# Patient Record
Sex: FEMALE | Race: WHITE | Employment: OTHER
[De-identification: names, ages, dates, MRNs, and addresses within clinical notes are randomized per-mention and may not be internally consistent; named-entity substitution may affect disease eponyms.]

---

## 2021-04-09 ENCOUNTER — TRANSCRIBE ORDERS (OUTPATIENT)
Dept: OTHER | Age: 66
End: 2021-04-09

## 2021-04-09 DIAGNOSIS — N83.209 OVARIAN CYST: Primary | ICD-10-CM

## 2021-04-09 NOTE — TELEPHONE ENCOUNTER
Action    Action Taken 4/9/21:  -Brian Film Room closed, faxed request  3:30 PM    4/12/21:    Imaging resolved to PACS  7:20 AM       RECORDS STATUS - ALL OTHER DIAGNOSIS      RECORDS RECEIVED FROM: Brian   DATE RECEIVED:    NOTES STATUS DETAILS   OFFICE NOTE from referring provider DOMINIQUE Del Valle: 4/9/21   OFFICE NOTE from medical oncologist     DISCHARGE SUMMARY from hospital     DISCHARGE REPORT from the ER     OPERATIVE REPORT DOMINIQUE Torres 6/16/20: EGD  6/27/12: Hysteoscopy   MEDICATION LIST     CLINICAL TRIAL TREATMENTS TO DATE     LABS     PATHOLOGY REPORTS     ANYTHING RELATED TO DIAGNOSIS     GENONOMIC TESTING     TYPE:     IMAGING (NEED IMAGES & REPORT)     CT SCANS     MRI     MAMMO     ULTRASOUND PACS 4/8/21: Brian   PET

## 2021-04-11 NOTE — PROGRESS NOTES
"Yamini is a 65 year old who is being evaluated via a billable video visit.      How would you like to obtain your AVS? MyChart     If the video visit is dropped, the invitation should be resent by: Text to cell phone: [phone]     Will anyone else be joining your video visit? No          Vitals - Patient Reported  Weight (Patient Reported): 53.5 kg (118 lb)  Height (Patient Reported): 162.6 cm (5' 4\")  BMI (Based on Pt Reported Ht/Wt): 20.25  Pain Score: No Pain (0)    Ryan Felix Biswas LPN    Video Start Time: 1048  Video-Visit Details    Type of service:  Video Visit    Video End Time:1138    Originating Location (pt. Location): Home    Distant Location (provider location):  [facility]     Platform used for Video Visit: [employer]                              Consult Notes on Referred Patient    Date: 2021       Dr. Sanchez referring provider defined for this encounter.       RE: Yamini Jackson  : 1955  DAVID: 2021    Dear Dr. Del Valle,    I had the pleasure of seeing your patient Yamini Jackson here at the Gynecologic Cancer Clinic at the [facility] on 2021.  As you know she is a very pleasant 65 year old woman with a recent diagnosis of complex left ovarian cyst.  Given these findings she was subsequently sent to the Gynecologic Cancer Clinic for new patient consultation.     2020 CT Ab/Pelv:  Pelvis: A 4 cm simple appearing cyst is in the left adnexa on series 2, image 117. Remainder of the pelvis is unremarkable.     2021 Pelvic US ([employer])  COMPARISON:  Pelvic ultrasound dated 2017 and CT of the pelvis dated 2020  FINDINGS:    Sonographic images demonstrate a normal size and smooth outer contour of the uterus. Uterus measures 5.4 cm in length by 3.1 cm in AP diameter by 4 point cm in transverse dimension.  The myometrium has a normal uniform echotexture. The endometrial lining appears normal and measures 2.0 mm in " composite thickness.         The right ovary appears normal and measures 2.4 x 1.3 x 2.1 cm. There is cystic enlargement of the left ovary which measures 7.0 x 2.7 x 4.7 cm. As compared to the prior sonogram dated 2017 and the prior CT dated 2000 the left ovarian cyst has increased in size. Currently it measures 4.7 x 2.8 x 4.3 cm and within one of the components there is layered debris. Previously there was a single thin internal septation and now there are several internal septations. There are no suspicious fluid collections within the cul-de-sac.         IMPRESSION:    Progressive enlargement and increasing complex appearance of the left ovarian cyst. Findings would raise a concern for malignancy and GYN referral recommended.      2021:  17      Today: Feeling good. Early-, had CT scan for RUQ pain and left ovarian cyst noted incidentally. Opted to track it after that. Then another CT last summer due to hospitalized diverticulitis and again left cyst noted incidentally. Have had GI issues chronically. Currently working on diet, have lost weight. Still working on gaining weight but haven't recovered completely. RUQ pain has come and gone, possibly triggered by fatty foods.  No other non-specific abdominal pain, everything previous has been linked to GI.  No bleeding.  No current pain with urination (has had a cystoscopy in the past - 2 years ago). No fevers, chills. No SOB or chestpain. Bowels and bladder working. Gastritis last April related to food and stress of COVID. Ended up in hospital with chills and fever and right sided pain near GB. Diagnosed with diverticulitis and responded to antibiotics. Strong family hx of colon cancer, sister with Crohn's disease.         Review of Systems:    I have reviewed the pertinent positive findings in the review of symptoms with the patient.        Past Medical History:    No heart disease or lung dz  HTN   x 3    Past Surgical History:      "Cervix polyp removed.  No abdominal surgery    Health Maintenance:  Health Maintenance Due   Topic Date Due     PREVENTIVE CARE VISIT  Never done     ADVANCE CARE PLANNING  Never done     MAMMO SCREENING  Never done     COLORECTAL CANCER SCREENING  Never done     HIV SCREENING  Never done     COVID-19 Vaccine (1) Never done     HEPATITIS C SCREENING  Never done     PAP  Never done     DTAP/TDAP/TD IMMUNIZATION (1 - Tdap) Never done     LIPID  Never done     ZOSTER IMMUNIZATION (1 of 2) Never done     INFLUENZA VACCINE (1) 2020     PHQ-2  Never done       Last Pap Smear:               Result: normal       Last Mammogram: 2019            Result: abnormal: followed up with US      She has had a history of abnormal mammograms.    Last Colonoscopy: 2020              Result: abnormal: diverticulosis done every 5 years.              Current Medications:     has a current medication list which includes the following prescription(s): metoprolol succinate er and omeprazole.       Allergies:     Allergies   Allergen Reactions     Gluten Meal Other (See Comments)     \"doest't eat, gets achy from it\"     Ibuprofen Hives and Itching     PN: LW Reaction: HIVES              Social History:     Social History     Tobacco Use     Smoking status: Not on file   Substance Use Topics     Alcohol use: Not on file       History   Drug Use Not on file           Family History:     The patient's family history is notable for the following    No uterine cancer in family.  No breast, ovarian  Colon cancer-mother age 63 right sided colon ca, aunt early 70's (maternal aunt), another maternal aunt with \"cancer of the omentum\"  age 61.    Physical Exam:     There were no vitals taken for this visit.  There is no height or weight on file to calculate BMI.    General Appearance: healthy and alert, no distress     No physical exam or VS done due to virtual visit because of COVID 19.        Assessment:    Yamini DERIK Jackson is a 65 " year old woman with a new diagnosis of complex ovarian mass            Plan:     1.)    Plan robotic TLH/BSO possible open possible cancer staging based on complex ovarian mass that is increasing in size.     2.) Genetic risk factors were assessed and the patient does meet qualifications for referral: colon cancer in multiple family members on maternal side-concern for Simons syndrome.     3.) Labs and/or tests ordered include:  Pre op exam, consult with GC     4.) Health maintenance issues addressed today include UTD    5.) Pre-op teaching was completed today.  Risks of surgery were discussed to include: bleeding, transfusion, infection, unintentional injury to surrounding organs/structures.      Thank you for allowing us to participate in the care of your patient.         Sincerely,  Saranya Higgins MD    Department of Ob/Gyn and Women's Health  Division of Gynecologic Oncology  [facility]  [phone]        No care team member to display

## 2021-04-12 ENCOUNTER — PRE VISIT (OUTPATIENT)
Dept: ONCOLOGY | Facility: CLINIC | Age: 66
End: 2021-04-12

## 2021-04-12 ENCOUNTER — VIRTUAL VISIT (OUTPATIENT)
Dept: ONCOLOGY | Facility: CLINIC | Age: 66
End: 2021-04-12
Attending: OBSTETRICS & GYNECOLOGY
Payer: COMMERCIAL

## 2021-04-12 DIAGNOSIS — R19.00 PELVIC MASS: Primary | ICD-10-CM

## 2021-04-12 DIAGNOSIS — Z80.0 FAMILY HISTORY OF COLON CANCER: ICD-10-CM

## 2021-04-12 PROCEDURE — 999N001193 HC VIDEO/TELEPHONE VISIT; NO CHARGE

## 2021-04-12 PROCEDURE — 99204 OFFICE O/P NEW MOD 45 MIN: CPT | Mod: GT | Performed by: OBSTETRICS & GYNECOLOGY

## 2021-04-12 RX ORDER — PHENAZOPYRIDINE HYDROCHLORIDE 200 MG/1
200 TABLET, FILM COATED ORAL ONCE
Status: CANCELLED | OUTPATIENT
Start: 2021-04-12 | End: 2021-04-12

## 2021-04-12 RX ORDER — ACETAMINOPHEN 325 MG/1
975 TABLET ORAL ONCE
Status: CANCELLED | OUTPATIENT
Start: 2021-04-12 | End: 2021-04-12

## 2021-04-12 RX ORDER — METOPROLOL SUCCINATE 25 MG/1
25 TABLET, EXTENDED RELEASE ORAL EVERY MORNING
COMMUNITY
Start: 2021-03-31

## 2021-04-12 RX ORDER — CEFAZOLIN SODIUM 2 G/50ML
2 SOLUTION INTRAVENOUS
Status: CANCELLED | OUTPATIENT
Start: 2021-04-12

## 2021-04-12 RX ORDER — HEPARIN SODIUM 5000 [USP'U]/.5ML
5000 INJECTION, SOLUTION INTRAVENOUS; SUBCUTANEOUS
Status: CANCELLED | OUTPATIENT
Start: 2021-04-12

## 2021-04-12 NOTE — LETTER
"    2021         RE: Yamini Jackson  [address]  [place] [address]        Dear Colleague,    Thank you for referring your patient, Yamini Jackson, to the [facility]. Please see a copy of my visit note below.    Yamini is a 65 year old who is being evaluated via a billable video visit.      How would you like to obtain your AVS? MyChart     If the video visit is dropped, the invitation should be resent by: Text to cell phone: [phone]     Will anyone else be joining your video visit? No          Vitals - Patient Reported  Weight (Patient Reported): 53.5 kg (118 lb)  Height (Patient Reported): 162.6 cm (5' 4\")  BMI (Based on Pt Reported Ht/Wt): 20.25  Pain Score: No Pain (0)    Ryan Ledezma LPN    Video Start Time: 1048  Video-Visit Details    Type of service:  Video Visit    Video End Time:1138    Originating Location (pt. Location): Home    Distant Location (provider location):  [facility]     Platform used for Video Visit: [employer]                              Consult Notes on Referred Patient    Date: 2021       Dr. Sanchez referring provider defined for this encounter.       RE: Yamini Jackson  : 1955  DAVID: 2021    Dear Dr. Del Valle,    I had the pleasure of seeing your patient Yamini Jackson here at the Gynecologic Cancer Clinic at the [facility] on 2021.  As you know she is a very pleasant 65 year old woman with a recent diagnosis of complex left ovarian cyst.  Given these findings she was subsequently sent to the Gynecologic Cancer Clinic for new patient consultation.     2020 CT Ab/Pelv:  Pelvis: A 4 cm simple appearing cyst is in the left adnexa on series 2, image 117. Remainder of the pelvis is unremarkable.     2021 Pelvic US ([facility])  COMPARISON:  Pelvic ultrasound dated 2017 and CT of the pelvis dated 2020  FINDINGS:    Sonographic images demonstrate " a normal size and smooth outer contour of the uterus. Uterus measures 5.4 cm in length by 3.1 cm in AP diameter by 4 point cm in transverse dimension.  The myometrium has a normal uniform echotexture. The endometrial lining appears normal and measures 2.0 mm in composite thickness.         The right ovary appears normal and measures 2.4 x 1.3 x 2.1 cm. There is cystic enlargement of the left ovary which measures 7.0 x 2.7 x 4.7 cm. As compared to the prior sonogram dated 07/13/2017 and the prior CT dated 06/14/2000 the left ovarian cyst has increased in size. Currently it measures 4.7 x 2.8 x 4.3 cm and within one of the components there is layered debris. Previously there was a single thin internal septation and now there are several internal septations. There are no suspicious fluid collections within the cul-de-sac.         IMPRESSION:    Progressive enlargement and increasing complex appearance of the left ovarian cyst. Findings would raise a concern for malignancy and GYN referral recommended.      4/9/2021:  17      Today: Feeling good. Early-2000s, had CT scan for RUQ pain and left ovarian cyst noted incidentally. Opted to track it after that. Then another CT last summer due to hospitalized diverticulitis and again left cyst noted incidentally. Have had GI issues chronically. Currently working on diet, have lost weight. Still working on gaining weight but haven't recovered completely. RUQ pain has come and gone, possibly triggered by fatty foods.  No other non-specific abdominal pain, everything previous has been linked to GI.  No bleeding.  No current pain with urination (has had a cystoscopy in the past - 2 years ago). No fevers, chills. No SOB or chestpain. Bowels and bladder working. Gastritis last April related to food and stress of COVID. Ended up in hospital with chills and fever and right sided pain near GB. Diagnosed with diverticulitis and responded to antibiotics. Strong family hx of colon  "cancer, sister with Crohn's disease.         Review of Systems:    I have reviewed the pertinent positive findings in the review of symptoms with the patient.        Past Medical History:    No heart disease or lung dz  HTN   x 3    Past Surgical History:     Cervix polyp removed.  No abdominal surgery    Health Maintenance:  Health Maintenance Due   Topic Date Due     PREVENTIVE CARE VISIT  Never done     ADVANCE CARE PLANNING  Never done     MAMMO SCREENING  Never done     COLORECTAL CANCER SCREENING  Never done     HIV SCREENING  Never done     COVID-19 Vaccine (1) Never done     HEPATITIS C SCREENING  Never done     PAP  Never done     DTAP/TDAP/TD IMMUNIZATION (1 - Tdap) Never done     LIPID  Never done     ZOSTER IMMUNIZATION (1 of 2) Never done     INFLUENZA VACCINE (1) 2020     PHQ-2  Never done       Last Pap Smear:               Result: normal       Last Mammogram: 2019            Result: abnormal: followed up with US      She has had a history of abnormal mammograms.    Last Colonoscopy: 2020              Result: abnormal: diverticulosis done every 5 years.              Current Medications:     has a current medication list which includes the following prescription(s): metoprolol succinate er and omeprazole.       Allergies:     Allergies   Allergen Reactions     Gluten Meal Other (See Comments)     \"doest't eat, gets achy from it\"     Ibuprofen Hives and Itching     PN: LW Reaction: HIVES              Social History:     Social History     Tobacco Use     Smoking status: Not on file   Substance Use Topics     Alcohol use: Not on file       History   Drug Use Not on file           Family History:     The patient's family history is notable for the following    No uterine cancer in family.  No breast, ovarian  Colon cancer-mother age 63 right sided colon ca, aunt early 70's (maternal aunt), another maternal aunt with \"cancer of the omentum\"  age 61.    Physical Exam:     There were " no vitals taken for this visit.  There is no height or weight on file to calculate BMI.    General Appearance: healthy and alert, no distress     No physical exam or VS done due to virtual visit because of COVID 19.        Assessment:    Yamini Jackson is a 65 year old woman with a new diagnosis of complex ovarian mass            Plan:     1.)    Plan robotic TLH/BSO possible open possible cancer staging based on complex ovarian mass that is increasing in size.     2.) Genetic risk factors were assessed and the patient does meet qualifications for referral: colon cancer in multiple family members on maternal side-concern for Simons syndrome.     3.) Labs and/or tests ordered include:  Pre op exam, consult with GC     4.) Health maintenance issues addressed today include UTD    5.) Pre-op teaching was completed today.  Risks of surgery were discussed to include: bleeding, transfusion, infection, unintentional injury to surrounding organs/structures.      Thank you for allowing us to participate in the care of your patient.         Sincerely,  Saranya Higgins MD    Department of Ob/Gyn and Women's Health  Division of Gynecologic Oncology  [facility]  [phone]        No care team member to display          Again, thank you for allowing me to participate in the care of your patient.        Sincerely,        Saranya Higgins MD

## 2021-04-13 NOTE — NURSING NOTE
Pre Op Nurse Teaching Template    Relevant Diagnosis: ovarian cyst    Teaching Topic: HYSTERECTOMY, TOTAL, ROBOT-ASSISTED, WITH BILATERAL SALPINGO-OOPHORECTOMY, AND BILATERAL PELVIC LYMPHADENECTOMY, possible open abdomen and cancer staging, cystoscopy    Person(s) involved in teaching :  Patient  Will send preop packet to pt and review via telephone  Motivation Level:  Asks Questions:    Yes      Eager to Learn:     Yes     Cooperative:          Yes    Receptive (willing. Able to accept information):    Yes      Patient and those who are listed above demonstrates understanding of the following:   Reason for the appointment, diagnosis and treatment plan:   Yes   Knowledge of proper use of medications and conditions for which they are ordered (with special attention to potential side effects or drug interactions): Yes   Which situations necessitate calling provider and whom to contact: Yes         Nutritional needs and diet plan:  Yes      Pain management techniques:     Yes, Pain Scale   Diet:   Yes, [facility] Diet Instructions    Teaching Concerns addressed: Yes    Infection Prevention:  Patient and those who are listed above demonstrate understanding of the following:  Pre-Op CHG Bathing Instructions: Yes  Surgical procedure site care taught:   Yes   Signs and symptoms of infection taught: Yes       Instructional Materials Used/Given:  The [place] Before You Surgery Booklet  Showering or Bathing before Surgery Instructions & CHG Product  Hysterectomy Guidelines  Pain Assessment Tool   Home Care after Major Abdominal or Vaginal Surgery  Map  Accommodations Brochure  Phone numbers for [facility] and [facility]  Copy of Surgical Consent    Done Today:    Preop Visit needed with PAC  Tests Ordered:  Post Op Visit Scheduled:tbd  Comments:    Surgery date/time:tbd  Genetic counseling appt requested  covid test to be done 3-4 days before surgery date    Consent to file in medical records  .

## 2021-04-13 NOTE — PATIENT INSTRUCTIONS
Surgery to be scheduled  Pre operative visit with PAC/PCP, scheduling will call you to help make appointments   Post operative visit will be set up about 2 weeks after your surgery, you will be contacted with at date and time  Referral to genetic counseling, scheduling will call to help make appointment

## 2021-04-14 ENCOUNTER — VIRTUAL VISIT (OUTPATIENT)
Dept: ONCOLOGY | Facility: CLINIC | Age: 66
End: 2021-04-14
Attending: OBSTETRICS & GYNECOLOGY
Payer: COMMERCIAL

## 2021-04-14 DIAGNOSIS — Z80.42 FAMILY HISTORY OF PROSTATE CANCER: ICD-10-CM

## 2021-04-14 DIAGNOSIS — Z80.0 FAMILY HISTORY OF COLON CANCER: Primary | ICD-10-CM

## 2021-04-14 DIAGNOSIS — R19.00 PELVIC MASS: ICD-10-CM

## 2021-04-14 PROCEDURE — 96040 HC GENETIC COUNSELING, EACH 30 MINUTES: CPT | Mod: GT | Performed by: GENETIC COUNSELOR, MS

## 2021-04-14 NOTE — LETTER
4/14/2021         RE: Yamini Jackson  [address] [place] [address]        Dear Colleague,    Thank you for referring your patient, Yamini Jackson, to the [facility]. Please see a copy of my visit note below.    4/14/2021    Referring Provider: Saranya Higgins MD    Presenting Information:   I met with Yamini Jackson today for genetic counseling at the Cancer Risk Management Program (virtual visit) to discuss her family history of colon cancer.  She is here today to review this history, cancer screening recommendations, and available genetic testing options.    Personal History:  Yamini is a 65 year old female.  She has seen Dr. Higgins to discuss surgical options for a complex ovarian mass (surgery tentatively planned for May).  Genetic testing has been requested to help determine whether a risk reducing hysterectomy would be considered at the time of surgery.  Her most recent mammogram was 11/25/2019, and colonoscopy was 8/17/2020 (follow up recommended in 5 years.       Family History: (Please see scanned pedigree for detailed family history information)    Her mother was diagnosed with colon cancer at age 63    One maternal aunt was diagnosed with colon cancer at age 72    One maternal aunt was diagnosed with a metastatic cancer at age 59 involving the omentum    Her maternal grandfather had a history of colon cancer and passed away at age 60    Her father was diagnosed with prostate cancer in his late 70's and passed away at age 86    Her maternal ethnicity is Cymro/Palestinian/Chinese. Her paternal ethnicity is [place].  There is no known Ashkenazi Mormonism ancestry on either side of her family.     Discussion:    Yamini's family history of colon cancer is suggestive of a hereditary cancer syndrome.    We reviewed the features of sporadic, familial, and hereditary cancers.  Based on her family history, Yamini meets current National Comprehensive Cancer Network  (NCCN) criteria for genetic testing of Simons syndrome.      We discussed the natural history and genetics of Ismons syndrome. There are currently five genes known to cause Simons Syndrome: MLH1, MSH2, MSH6, PMS2, and EPCAM.  A single mutation in one of the Simons Syndrome genes increases the risk for colon, endometrial, ovarian, and stomach cancers.  Other cancers that can be seen in some families with Simons Syndrome include pancreatic, bladder, biliary tract, urothelial, small bowel, prostate, breast and brain cancers.    A detailed handout regarding hereditary colon cancer and the information we discussed was provided to Yamini at the end of our appointment today and can be found in the after visit summary. Topics included: inheritance pattern, cancer risks, cancer screening recommendations, and also risks, benefits and limitations of testing.     We discussed that there are additional genes that could cause increased risk for colorectal, gynecologic, and other cancers. As many of these genes present with overlapping features in a family and accurate cancer risk cannot always be established based upon the pedigree analysis alone, it would be reasonable for Yamini to consider panel genetic testing to analyze multiple genes at once.    We discussed that the results from genetic testing may determine increased cancer screening, as well as options for risk reducing surgeries Yamini could consider.  These results may also help to guide surgery decision making for her ovarian mass.  In addition, the results of Yamini's test may also determine testing recommendations for her close relatives, including her children and siblings. These recommendations will be discussed in detail once genetic testing is completed.     Plan:  1) Today Yamini elected to proceed with the CancerNext gene panel through [employer].  2) This information should be available in 4 weeks.  Of note, results have been requested prior to  Yamini's surgery (tentatively scheduled for 3-5 weeks from now).    3) Yamini will return to clinic to discuss the results.    Face to face time: 60 minutes    Deandra Stein MS, [facility]  Licensed, Certified Genetic Counselor  [facility]  Phone: [phone]                Again, thank you for allowing me to participate in the care of your patient.        Sincerely,        Deandra Stein GC

## 2021-04-14 NOTE — PROGRESS NOTES
4/14/2021    Referring Provider: Saranya Higgins MD    Presenting Information:   I met with Yamini Jackson today for genetic counseling at the Cancer Risk Management Program (virtual visit) to discuss her family history of colon cancer.  She is here today to review this history, cancer screening recommendations, and available genetic testing options.    Personal History:  Yamini is a 65 year old female.  She has seen Dr. Higgins to discuss surgical options for a complex ovarian mass (surgery tentatively planned for May).  Genetic testing has been requested to help determine whether a risk reducing hysterectomy would be considered at the time of surgery.  Her most recent mammogram was 11/25/2019, and colonoscopy was 8/17/2020 (follow up recommended in 5 years.       Family History: (Please see scanned pedigree for detailed family history information)    Her mother was diagnosed with colon cancer at age 63    One maternal aunt was diagnosed with colon cancer at age 72    One maternal aunt was diagnosed with a metastatic cancer at age 59 involving the omentum    Her maternal grandfather had a history of colon cancer and passed away at age 60    Her father was diagnosed with prostate cancer in his late 70's and passed away at age 86    Her maternal ethnicity is Burkinan/Andorran/Faroese. Her paternal ethnicity is Yi.  There is no known Ashkenazi Buddhist ancestry on either side of her family.     Discussion:    Yamini's family history of colon cancer is suggestive of a hereditary cancer syndrome.    We reviewed the features of sporadic, familial, and hereditary cancers.  Based on her family history, Yamini meets current National Comprehensive Cancer Network (NCCN) criteria for genetic testing of Simons syndrome.      We discussed the natural history and genetics of Simons syndrome. There are currently five genes known to cause Simons Syndrome: MLH1, MSH2, MSH6, PMS2, and EPCAM.  A single mutation in one of the Simons  Syndrome genes increases the risk for colon, endometrial, ovarian, and stomach cancers.  Other cancers that can be seen in some families with Simons Syndrome include pancreatic, bladder, biliary tract, urothelial, small bowel, prostate, breast and brain cancers.    A detailed handout regarding hereditary colon cancer and the information we discussed was provided to Yamini at the end of our appointment today and can be found in the after visit summary. Topics included: inheritance pattern, cancer risks, cancer screening recommendations, and also risks, benefits and limitations of testing.     We discussed that there are additional genes that could cause increased risk for colorectal, gynecologic, and other cancers. As many of these genes present with overlapping features in a family and accurate cancer risk cannot always be established based upon the pedigree analysis alone, it would be reasonable for Yamini to consider panel genetic testing to analyze multiple genes at once.    We discussed that the results from genetic testing may determine increased cancer screening, as well as options for risk reducing surgeries Yamini could consider.  These results may also help to guide surgery decision making for her ovarian mass.  In addition, the results of Yamini's test may also determine testing recommendations for her close relatives, including her children and siblings. These recommendations will be discussed in detail once genetic testing is completed.     Plan:  1) Today Yamini elected to proceed with the CancerNext gene panel through [employer].  2) This information should be available in 4 weeks.  Of note, results have been requested prior to Yamini's surgery (tentatively scheduled for 3-5 weeks from now).    3) Yamini will return to clinic to discuss the results.    Face to face time: 60 minutes    Deandra Stein MS, [facility] [place]  Licensed, Certified Genetic Counselor  [facility]  Phone:  [phone]

## 2021-04-15 NOTE — PATIENT INSTRUCTIONS
Assessing Cancer Risk  Only about 5-10% of cancers are thought to be due to an inherited cancer susceptibility gene.    These families often have:  ? Several people with the same or related types of cancer  ? Cancers diagnosed at a young age (before age 50)  ? Individuals with more than one primary cancer  ? Multiple generations of the family affected with cancer    Comprehensive Colon Cancer Panel  We each inherit two copies of every gene in our bodies: one from our mother, and one from our father.  Each gene has a specific job to do.  When a gene has a mistake or  mutation  in it, it does not work like it should.      This handout will review common hereditary colon cancer syndromes, and other genes related to an increased risk for colon cancer.  The genes that will be discussed in this handout are: APC, BMPR1A, CDH1, CHEK2, EPCAM, GREM1, MLH1, MSH2, MSH6, MUTYH, PMS2, POLD1, POLE, PTEN, SMAD4, STK11, and TP53.  These genes are clinically actionable, meaning there are published guidelines for cancer screening and management for individuals who are found to carry mutations in these genes. Inheriting a mutation does not mean a person will develop cancer, but it does significantly increase his or her risk above the general population risk.      Familial Adenomatous Polyposis (FAP)  FAP is a hereditary cancer syndrome caused by mutations in the APC gene. The condition is known to cause hundreds to thousands of adenomatous polyps in the colon, creating a  carpet  of polyps. Some individuals have what is called attenuated FAP (AFAP), a milder form of FAP with fewer polyps and typically later onset. Individuals with an APC gene mutation are at an increased risk for colon, thyroid, and duodenal cancers, as well as several other types of cancer1.  Other features of this condition may include: osteomas, dental anomalies, benign skin lesions, CHRPE ( freckle  on the inside of the eye), and desmoid tumors.      Lifetime  Cancer Risks     Cancer Type General Population FAP   Colon  5% near 100%   Thyroid (papillary) 1% 1-12%   Duodenal <1% 5%   Liver  <1% 1-2% before age 5   Pancreas <1% 1%   Stomach <1% 1%       Juvenile Polyposis Syndrome (JPS)  JPS is characterized by hamartomatous polyps, called juvenile polyps, in the gastrointestinal tract.  Juvenile  refers to the type of polyps seen in this hereditary cancer condition, not the age of onset. Currently, mutations in two genes are known to cause JPS: BMPR1A and SMAD4. Of individuals clinically diagnosed with JPS, only 40% have an identifiable mutation in one of these genes, suggesting there are other genes that cause JPS that have not been discovered yet. Individuals with JPS are at an increased risk for colon cancer and stomach cancer 2,3,4. Pancreatic and small bowel cancers have also been reported in JPS, but the actual risks are unknown.         Lifetime Cancer Risks    Cancer Type General Population JPS   Colon 5% 40-50%   Gastric/Duodenal <1% 10-21%     Some individuals with SMAD4 mutations have a condition called JPS/HHT (Juvenile Polyposis/Hereditary Hemorrhagic Telangiectasia) where in addition to JPS, individuals may have nose bleeds and clotting issues.     Hereditary Diffuse Gastric Cancer (HDGC)  Currently, mutations in one gene are known to cause Hereditary Diffuse Gastric Cancer: CDH1.  Individuals with HDGC are at increased risk for diffuse gastric cancer and lobular breast cancer. Of people diagnosed with HDGC, 30-50% have a mutation in the CDH1 gene.  This suggests there are likely mutations in other genes that may cause HDGC that have not been identified yet. Individuals with HDGC may also be at increased risk for colon cancer.      Lifetime Cancer Risks    Cancer Type General Population HDGC   Diffuse Gastric <1% 67-83%   Breast 12% 39-52%     Simons syndrome  Mutations in five different genes are known to cause Simons syndrome: MLH1, MSH2, MSH6, PMS2, and  EPCAM. Individuals with Simons syndrome have an increased risk for colon, uterine, ovarian, small bowel, stomach, urinary tract, and brain cancer, as well as several other types of cancer. The exact lifetime cancer risks are dependent upon the gene in which the mutation was identified.      Lifetime Cancer Risks    Cancer Type General Population Simons syndrome   Colon 5% 12-52%   Uterine 2-3% Up to 57%   Stomach <1% Up to 16%   Ovarian 2% Up to 38%   Urinary tract <1% 1-7%   Hepatobiliary tract <1% 1-4%   Small bowel <1% Up to 11%   Brain/CNS <1% Not well established   Pancreas <1% Up to 6%       MUTYH-Associated Polyposis (MAP)  MAP is a hereditary cancer syndrome caused by mutations in the MUTYH gene. Unlike the other hereditary cancer genes discussed in this handout, two mutations in the MUTYH gene cause MAP and increase cancer risk. Those affected with MAP typically have between  adenomatous polyps. This syndrome also increases the risk for colon and duodenal cancer. Current research suggests that other cancers may be associated with MUTYH mutations, as well. The table below includes the risk that someone with two MUTYH gene mutations would develop cancer in their lifetime; of note, there is also an increased colon cancer risk for individuals who carry only one MUTYH gene mutation5,6,7.       Lifetime Cancer Risks    Cancer Type General Population MAP   Colon 5% %   Duodenal  <1% 5%       Cowden syndrome  Cowden syndrome is a hereditary condition that increases the risk for breast, thyroid, endometrial, colon, and kidney cancer.  A single mutation in the PTEN gene causes Cowden syndrome and increases cancer risk.  The table below shows the chance that someone with a PTEN mutation would develop cancer in their lifetime8,9.  Other benign features seen in some individuals with Cowden syndrome include benign skin lesions (facial papules, keratoses, lipomas), learning disabilities, autism, thyroid nodules,  hamartomatous colon polyps, and larger head size.      Lifetime Cancer Risks   Cancer Type General Population Cowden Syndrome   Breast 12% 25-50%*   Thyroid 1% 35%   Renal 1-2% 35%   Endometrial 2-3% 28%   Colon 5% 9%   Melanoma 2-3% >5%     *One recent study found breast cancer risk to be increased to 85%    Peutz-Jeghers syndrome (PJS)  PJS is a hereditary cancer syndrome caused by mutations in the STK11 gene. This condition can be distinguished from other hereditary syndromes by the presence of hamartomatous polyps in the gastrointestinal tract and freckles present in unusual places such as the hands, feet, neck, and lips. Individuals with Peutz-Jeghers syndrome have an increased risk for colon, breast, pancreatic, and other cancers3.  Men are at risk for testicular tumors which can affect hormones in the body. Women are at risk for sex cord tumors of the ovaries and a rare aggressive type of cervical cancer.     Lifetime Cancer Risks    Cancer Type General Population PJS   Breast 12% 45-50%   Colon 5% 39%   Stomach <1% 29%   Pancreas 1.5% 11-36%   Small Intestine <1% 13%     Ovarian  2%  18%   Lung 6-7% 15-17%     Additional Genes Associated with Increased Colon Cancer Risk  CHEK2  CHEK2 is a moderate-risk breast cancer gene.  Women who have a mutation in CHEK2 have around a 2-fold increased risk for breast cancer compared to the general population, and this risk may be higher depending upon family history.12,13,14.  Mutations in CHEK2 have also been shown to increase the risk of a number of other cancers, including colon and prostate, however these cancer risks are currently not well understood.     GREM1  GREM1 is a moderate-risk colon polyposis gene. Duplications of this gene are more commonly found in individuals with Ashkenazi Synagogue [id number]. Mutations in GREM1 are associated with colon polyps and therefore an increased risk of colon cancer; however the estimated cancer risk is not well [id number].      POLD1 and POLE  POLD1 and POLE are moderate-risk colon cancer genes. Carriers of a mutation in one of these genes increases the lifetime risk of colorectal [id number],18,19,20. Mutations in these genes may also be associated with increased risk for other cancers including: endometrial cancer, duodenal adenomas and carcinomas, and brain tumors.    TP53  Li Fraumeni syndrome (LFS) is a hereditary cancer predisposition syndrome. LFS is caused by a mutation in the TP53 gene. A single mutation in one of the copies of TP53 increases the risk for multiple cancers. Individuals with LFS are at an increased risk for developing cancer at a young age. The general lifetime risk for development of cancer is 50% by age 30 and 90% by age 60.      Core Cancers: Sarcomas, Breast, Brain, Lung, Leukemias/Lymphomas, Adrenocortical carcinomas  Other Cancers: Gastrointestinal, Thyroid, Skin, Genitourinary    Genetic Testing  Genetic testing involves a simple blood test and will look at the genetic information in genes associated with an increased risk of colon cancer. The tests look for any harmful mutations that are associated with increased cancer risk.  If possible, it is recommended that the person(s) who has had cancer be tested before other family members.  That person will give us the most useful information about whether or not a specific gene mutation is associated with the cancer in the family.     Results  There are three possible results from genetic testing:  ? Positive--a harmful mutation was identified  ? Negative--no mutation was identified  ? Variant of unknown significance--a variation in one of the genes was identified, but it is unclear how this impacts cancer risk in the family  Advantages and Disadvantages  There are advantages and disadvantages to genetic testing of these genes.    Advantages  ? May clarify your cancer risk  ? Can help you make medical decisions  ? May explain the cancers in your family  ? May  give useful information to your family members (if you share your results)    Disadvantages  ? Possible negative emotional impact of learning about inherited cancer risk  ? Uncertainty in interpreting a negative test result in some situations  ? Possible genetic discrimination concerns (see below)    Inheritance   Most mutations in the genes outlined above are inherited in an autosomal dominant pattern.  This means that if a parent has a mutation, each of his or her children will have a 50% chance of inheriting that same mutation.  Therefore, each child--male or female--would have a 50% chance of being at increased risk for developing cancer.                                            Image obtained from [employer], 2013     In the case of MUTYH-Associated Polyposis (MAP) this hereditary cancer syndrome is inherited in an autosomal recessive pattern. This means that each parent of an individual with MAP is a carrier of MAP, meaning that they have only one mutation in MUTYH. They still have one functioning copy of their gene.  Carriers are at a slightly higher risk for colon cancer than the general population. If each parent is a carrier for MAP, they have a 25% of having a child who is affected with MAP, meaning the child inherited both gene mutations - one from each parent.       Image obtained from [employer], 2016    Genetic Information Nondiscrimination Act (EMY)  EMY is a federal law that protects individuals from health insurance or employment discrimination based on a genetic test result alone.  Although rare, there are currently no legal protections in terms of life insurance, long term care, or disability insurances.  Visit the National Human Genome Research [place] at Genome.gov/57084728 to learn more.    Reducing Cancer Risk  Each of the genes listed within this handout have nationally recognized cancer screening guidelines that would be recommended for individuals who test  positive.  In addition to increased cancer screening, surgeries may be offered or recommended to reduce cancer risk in certain cases.  Recommendations are based upon an individual s genetic test result as well as their personal and family history of cancer.    Questions to Think About Regarding Genetic Testing  ? What effect will the test result have on me and my relationship with my family members if I have an inherited gene mutation?  If I don t have a gene mutation?  ? Should I share my test results, and how will my family react to this news, which may also affect them?  ? Are my children ready to learn new information that may one day affect their own health?    Resources    PTEN World [employer] Information Technologyworld.aiHit   No Stomach for Cancer, Inc. nostomachforcancer.org   Stomach Cancer Relief Network scrnet.org   Collaborative Group of the Americas on Inherited Colorectal Cancer (CGA) cgaicc.com   Cancer Care cancercare.org   American Cancer Society (ACS) cancer.org   National Cancer [place] (NCI) cancer.org   Simons Syndrome International [email or web address].[email or web address]       Please call us if you have any questions or concerns.     Cancer Risk Management Program [phone]-CANCER ([phone])  ? Shukri Carrera, MS, [facility] [phone]  ? Xiomara Ruiz, MS, [facility]  [phone]  ? Deandra Stein, MS, [facility]  [phone]  ? Mayte Mcfarlane, MS, [facility] [phone]  ? Avis Mo, MS, [facility] [phone]  ? Ev Kelly, MS, [facility]  [phone]    References    1. Jos KANG, Jarocho J, Oziel G, Albert E, Luna J, et al. The Prevalence of thyroid cancer and benign thyroid disease in patients with familial adenomatous polyposis may be higher than previously recognized. Clin Colorectal Cancer. 2012;11:304-308.  2. Addison L, Felix Thompson A, Martir L, Jeanna C, Johana K, et al. Risk of colorectal cancer in juvenile polyposis. Gut. 2007;56:965-967.  3. Wolf FG, Michael MN, Gelacio CA. Colorectal cancer risk in hamartomatous polyposis syndromes.  World Journal of Gastrointestinal Surgery. 2015;27:25-32  4. Teresa MARTINEZ. Guidance on gastrointestinal surveillance for hereditary non-polyposis colorectal cancer, familial adenomatous polypolis, juvenile polyposis, and Peutz-Jeghers syndrome. Gut. 2002;51:21-27.  5. Panchito AK, Meir ELISHA, Mouna JG et al. Risk of extracolonic cancers for people with biallelic and monoallelic mutations in MUTYH. Int J of Cancer. 2016;139:8685-3710.  6. Juvencio S, Gil S, Misael H, Joyce K, Betancourt M, et al. MUTYH-associated polyposis: 70 of 71 patients with biallelic mutations present with an attenuated or atypical phenotype. Int J of Cancer. 2006;119:807-814.  7. Octavia G, Blanka F, Jermain I, Nova M, Octavia H, et al. MUTYH mutation carriers have increased breast cancer risk. Cancer. 2012;6685-8460.  8. Bryan MH, Chito J, Rakesh J, Michael LA, Man MS, Eng C. Lifetime cancer risks in individuals with germline PTEN mutations. Clin Cancer Res. 2012;18:400-7.  9. Pilkatheki R. Cowden Syndrome: A Critical Review of the Clinical Literature. J Hannah . 2009:18:13-27.  10. National Comprehensive Cancer Network. Clinical practice guidelines in oncology, colorectal cancer screening. Available online (registration required). 2013.  11. National Cancer [place]. SEER Cancer Stat Fact Sheets.  December 2013.  12. CHEK2 Breast Cancer Case-Control Consortium. CHEK2*1100delC and susceptibility to breast cancer: A collaborative analysis involving 10,860 breast cancer cases and 9,065 controls from 10 studies. Am J Hum Hannah, 74 (2004), pp. 7586-3044  13. Shabbir T, Hollis S, Kerline K, et al. Spectrum of Mutations in BRCA1, BRCA2, CHEK2, and TP53 in Families at High Risk of Breast Cancer. ZOLTAN. 2006;295(12):7937-8284.  14. Levy C, Diogo D, Boni A, et al. Risk of breast cancer in women with a CHEK2 mutation with and without a family history of breast cancer. J Clin Oncol. 2011;29:2896-6969.  15. Esther ALVAREZ, Briseyda E, Trey J, Ruperto JESSICA,  Stefan WOLF et al. Defining the polyposis/colorectal cancer phenotype associated with the GREM1 duplication: counseling and management guidelines. Hannah .Res. 2016;98:1-5.  16. Kymberly KANG, Jorgito S, Sebastian MORE, Justin Oakley, et al. Hereditary mixed polyposis syndrome is caused by a 40kb upstream duplication that leads to increased and ectopic expression of the BMP antagonist GREM1. Jeannette Hannah. 2015;44:699-703.  17. STELLA Sánchez. et al. Germline mutations affecting the proofreading domains of POLE and POLD1 predispose to colorectal adenomas and carcinomas. Jeannette. Hannah. 45, 136-44 (2013).  18. ERIC Altamirano. et al. POLE and POLD1 mutations in 529 jacobo with familial colorectal cancer and/or polyposis: review of reported cases and recommendations for genetic testing and surveillance. Hannah. Med. (2015). doi:10.1038/gim.2015.75  19. KATERINA Malhotra et al. New insights into POLE and POLD1 germline mutations in familial colorectal cancer and polyposis. Hum. Mol. Hannah. 23, 7442-12 (2014).  20. JENNI Alexander. et al. Frequency and phenotypic spectrum of germline mutations in POLE and seven other polymerase genes in 266 patients with colorectal adenomas and carcinomas. Int. J. Cancer 137, 320-31 (2015).

## 2021-04-16 ENCOUNTER — DOCUMENTATION ONLY (OUTPATIENT)
Dept: ONCOLOGY | Facility: CLINIC | Age: 66
End: 2021-04-16

## 2021-04-16 ENCOUNTER — PATIENT OUTREACH (OUTPATIENT)
Dept: ONCOLOGY | Facility: CLINIC | Age: 66
End: 2021-04-16

## 2021-04-16 DIAGNOSIS — R19.00 PELVIC MASS: Primary | ICD-10-CM

## 2021-04-16 NOTE — PROGRESS NOTES
Surgery is scheduled with Dr. Higgins on 5/12 at [place].  Scheduled per surgeon/availability.    H&P: to be completed by PCP or Surgeon    Additional appointments:   NO ADDITIONAL APPOINTMENTS NEEDED AT THIS TIME    [place] [facility] [place]     Post-op: will be scheduled by the clinic.     The RN completed the education regarding the surgery.     Patient will receive a phone call from pre-admission nurses 1-2 days prior to surgery with arrival and start time.    The surgery packet was provided by the RN during appointment.    Patient will complete COVID-19 test that was scheduled by surgical coordinator 2-4 days prior to surgery.     Per communication - I did not need to reach out to the patient regarding the above information. If this changes, I will reach out.

## 2021-04-19 DIAGNOSIS — Z80.42 FAMILY HISTORY OF PROSTATE CANCER: ICD-10-CM

## 2021-04-19 DIAGNOSIS — Z80.0 FAMILY HISTORY OF COLON CANCER: ICD-10-CM

## 2021-04-20 ENCOUNTER — DOCUMENTATION ONLY (OUTPATIENT)
Dept: ONCOLOGY | Facility: CLINIC | Age: 66
End: 2021-04-20

## 2021-04-20 NOTE — PROGRESS NOTES
Surgery is rescheduled with Dr. Higgins on 5/19 at [place].  Rescheduled per RN/patient.    H&P: to be completed by PAC: virtual visit 5/10    Additional appointments:   NO ADDITIONAL APPOINTMENTS NEEDED AT THIS TIME    COVID-19 test: 5/15 at [facility] [place], [place]     Post-op: will be scheduled by the clinic.     The RN completed the education regarding the surgery.     Patient will receive surgery arrival and start time from PAC.    The surgery packet was provided by the RN during appointment.    Patient will complete COVID-19 test that was scheduled by surgical coordinator 2-4 days prior to surgery.     Per communication - I did not need to reach out to the patient regarding the above information. If this changes, I will reach out.

## 2021-04-20 NOTE — PROGRESS NOTES
Reviewed potential date of surgery  Pt has sent off genetic testing today, per pt she said they would expedite results if possible because surgery is pending  Pt wonders if should give additional week to get test back as this test has implications on surgery options  Pt would like surgery on 5/19 instead  Questions on exactly what is going to happen at surgery  Pt understanding is plan to start with left ovary and do frozen section if negative and genetic testing negative does other ovary really need to be removed if testing positive right ovary will be removed .  If left ovary positive for cancer then all will come out  Regardless of genetic testing  Pt wonders if positive genetic testing and ovary negative what are plans for other ovary.   Will check with md and get back to pt

## 2021-04-21 NOTE — TELEPHONE ENCOUNTER
FUTURE VISIT INFORMATION      SURGERY INFORMATION:    Date: 21    Location: [facility] OR    Surgeon:  Saranya Higgins MD    Anesthesia Type:  Combined General with Block    Procedure: HYSTERECTOMY, TOTAL, ROBOT-ASSISTED, WITH BILATERAL SALPINGO-OOPHORECTOMY, AND BILATERAL PELVIC LYMPHADENECTOMY HYSTERECTOMY, TOTAL ABDOMINAL, WITH BILATERAL SALPINGO-OOPHORECTOMY, WITH LYMPHADENECTOMY CYSTOSCOPY    Consult: virtual visit     RECORDS REQUESTED FROM:       Primary Care Provider: Jennifer Carr MD  - Brian    Most recent EKG+ Tracin20- Allnany    Most recent ECHO: 2005- Brian

## 2021-04-22 ENCOUNTER — MYC MEDICAL ADVICE (OUTPATIENT)
Dept: ONCOLOGY | Facility: CLINIC | Age: 66
End: 2021-04-22

## 2021-04-30 LAB
LAB SCANNED RESULT: NORMAL
MISCELLANEOUS TEST: NORMAL

## 2021-05-02 ENCOUNTER — HEALTH MAINTENANCE LETTER (OUTPATIENT)
Age: 66
End: 2021-05-02

## 2021-05-06 DIAGNOSIS — Z11.59 ENCOUNTER FOR SCREENING FOR OTHER VIRAL DISEASES: ICD-10-CM

## 2021-05-10 ENCOUNTER — VIRTUAL VISIT (OUTPATIENT)
Dept: SURGERY | Facility: CLINIC | Age: 66
End: 2021-05-10
Payer: COMMERCIAL

## 2021-05-10 ENCOUNTER — VIRTUAL VISIT (OUTPATIENT)
Dept: ONCOLOGY | Facility: CLINIC | Age: 66
End: 2021-05-10
Attending: GENETIC COUNSELOR, MS
Payer: COMMERCIAL

## 2021-05-10 ENCOUNTER — ANESTHESIA EVENT (OUTPATIENT)
Dept: SURGERY | Facility: CLINIC | Age: 66
End: 2021-05-10

## 2021-05-10 ENCOUNTER — PRE VISIT (OUTPATIENT)
Dept: SURGERY | Facility: CLINIC | Age: 66
End: 2021-05-10

## 2021-05-10 DIAGNOSIS — Z01.818 PRE-OP EXAMINATION: Primary | ICD-10-CM

## 2021-05-10 DIAGNOSIS — Z80.0 FAMILY HISTORY OF COLON CANCER: Primary | ICD-10-CM

## 2021-05-10 DIAGNOSIS — Z80.42 FAMILY HISTORY OF PROSTATE CANCER: ICD-10-CM

## 2021-05-10 DIAGNOSIS — R19.00 PELVIC MASS: ICD-10-CM

## 2021-05-10 PROCEDURE — 99204 OFFICE O/P NEW MOD 45 MIN: CPT | Mod: GT | Performed by: NURSE PRACTITIONER

## 2021-05-10 PROCEDURE — 999N000069 HC STATISTIC GENETIC COUNSELING, < 16 MIN: Mod: GT | Performed by: GENETIC COUNSELOR, MS

## 2021-05-10 RX ORDER — POTASSIUM CHLORIDE 1125 MG/1
15 TABLET, EXTENDED RELEASE ORAL
COMMUNITY

## 2021-05-10 RX ORDER — IBUPROFEN 200 MG
1 CAPSULE ORAL 3 TIMES DAILY
COMMUNITY

## 2021-05-10 RX ORDER — FAMOTIDINE 10 MG
10 TABLET ORAL EVERY MORNING
COMMUNITY

## 2021-05-10 RX ORDER — MULTIVITAMIN WITH IRON
1 TABLET ORAL
COMMUNITY

## 2021-05-10 ASSESSMENT — LIFESTYLE VARIABLES: TOBACCO_USE: 0

## 2021-05-10 ASSESSMENT — PAIN SCALES - GENERAL: PAINLEVEL: NO PAIN (0)

## 2021-05-10 NOTE — LETTER
5/10/2021         RE: Yamini Jackson  [address] [facility] [address]        Dear Colleague,    Thank you for referring your patient, Yamini Jackson, to the [facility]. Please see a copy of my visit note below.    5/10/2021    Referring Provider: Saranya Higgins MD    Presenting Information:  Yamini Jackson returned to the Cancer Risk Management Program (virtual visit) to discuss her genetic testing results.     Genetic Testing Result: NEGATIVE  No pathogenic mutations, variants of unknown significance, or gross deletions or duplications were detected.       Genes Analyzed (36 total): APC, GOLD, AXIN2, BARD1, BMPR1A, BRCA1, BRCA2, BRIP1, CDH1, CDK4, CDKN2A, CHEK2, DICER1, MLH1, MSH2, MSH3, MSH6, MUTYH, NBN, NF1, NTHL1, PALB2, PMS2, PTEN, RAD51C, RAD51D, RECQL, SMAD4, SMARCA4, STK11 and TP53 (sequencing and deletion/duplication); HOXB13, POLD1 and POLE (sequencing only); EPCAM and GREM1 (deletion/duplication only).     A copy of the test report can be found in the Laboratory tab, dated 4/19/2021.     Interpretation:  We discussed several different interpretations of this negative test result.    1. One explanation may be that there is a different gene or combination of genes and environment that are associated with the cancers in this family.  2. It is possible that a cancer susceptibility gene may be present in Yamini's family which she did not inherit.  3. There is also a small possibility that there is a mutation in one of these genes, and the testing laboratory could not find it with their current testing methods.       Screening:  Based on this negative test result, it is important for Yamini and her relatives to refer back to the family history for appropriate cancer screening.      Yamini's most recent mammogram was 11/25/2019, and colonoscopy was 8/17/2020 (follow up recommended in 5 years due to family history).    Other population cancer screening  options, such as those recommended by the American Cancer Society and the National Comprehensive Cancer Network (NCCN), are also appropriate for Yamini and her family. These screening recommendations may change if there are changes to Yamini's personal and/or family history of cancer. Final screening recommendations should be made by each individual's managing physician.         Inheritance:  We reviewed the autosomal dominant inheritance of mutations in these 36 genes. We discussed that Yamini cannot/did not pass on an identifiable mutation in these genes to her children based on this test result. Mutations in these genes do not skip generations.      Additional Testing Considerations:  Although Yamini's genetic testing result was negative, other relatives may still carry a gene mutation associated with hereditary cancer. Genetic counseling is an available option for her close maternal relatives, as well as her siblings, due to the significant family history of cancer.    Summary:  We do not have an explanation for Yamini's family history of cancer. While no genetic changes were identified, Yamini may still be at risk for certain cancers due to family history, environmental factors, or other genetic causes not identified by this test.  Because of that, it is important that she continue with cancer screening based on her personal and family history as discussed above.    Genetic testing is rapidly advancing, and new cancer susceptibility genes will most likely be identified in the future. Therefore, I encouraged Yamini to contact me annually or if there are changes in her personal or family history. This may change how we assess her cancer risk, screening, and the testing we would offer.    Plan:  1.  I provided Yamini with her test results today.  2. She plans to follow-up with Dr. Higgins and her managing physicians.  3. If Yamini has any further questions, I encouraged her to contact me at  [phone].    Time spent face to face: 15 minutes    Deandra Stein MS, [facility] [place]  Licensed, Certified Genetic Counselor  [facility]  Phone: [phone]                Again, thank you for allowing me to participate in the care of your patient.        Sincerely,        Deandra Stein, GC

## 2021-05-10 NOTE — PATIENT INSTRUCTIONS
Preparing for Your Surgery      Name:  Yamini Jackson   MRN:  [id number]   :  1955   Today's Date:  5/10/2021       Arriving for surgery:  Surgery date:  21  Arrival time:  5AM    Restrictions due to COVID 19:  One consistent visitor per patient is allowed.  The visitor will be allowed in the pre-op area.  Visitors are asked to leave the building during the surgery.  No ill visitors.  All visitors must wear face mask.    [employer] parking is available for anyone with mobility limitations or disabilities.  ([employer]  24 hours/ 7 days a week; [employer]  7 am- 3:30 pm, Mon- Fri)    Please come to:     [facility] [employer] [address]  [address] [place] [address]    When entering the hospital you will be asked COVID screening questions, you will then be directed to Security and registration.  Registration will direct you to the correct lobby to wait until escorted to the preop area. Preop number- [phone]?     What can I eat or drink?  -  You may eat and drink normally for up to 8 hours before your surgery. (Until 21, 11:30PM)  -  You may have clear liquids until 2 1/2 hours before surgery. (Until 21, 5AM)    Examples of clear liquids:  Water  Clear broth  Juices (apple, white grape, white cranberry  and cider) without pulp  Noncarbonated, powder based beverages  (lemonade and Endy-Aid)  Sodas (Sprite, 7-Up, ginger ale and seltzer)  Coffee or tea (without milk or cream)  Gatorade    -  No Alcohol for at least 24 hours before surgery     Which medicines can I take?    Hold Aspirin for 7 days before surgery.   Hold Multivitamins for 7 days before surgery.  Hold Supplements, Colostrum and Magnesium for 7 days before surgery.  Hold Ibuprofen (Advil, Motrin) for 1 day before surgery--unless otherwise directed by surgeon.  Hold Naproxen (Aleve) for 4 days before surgery.    -  DO NOT take these medications the day of  surgery:    Calcium   Viactiv    Potassium    -  PLEASE TAKE these medications the day of surgery:    Famotidine(Pepcid)    Metoprolol    Omeprazole(Prilosec)    How do I prepare myself?  - Please take 2 showers before surgery using Scrubcare or Hibiclens soap.    Use this soap only from the neck to your toes.     Leave the soap on your skin for one minute--then rinse thoroughly.      You may use your own shampoo and conditioner; no other hair products.   - Please remove all jewelry and body piercings.  - No lotions, deodorants or fragrance.  - No makeup or fingernail polish.   - Bring your ID and insurance card.    - All patients are required to have a Covid-19 test within 4 days of surgery/procedure.      -Patients will be contacted by the [facility] scheduling team within 1 week of surgery to make an appointment.      - Patients may call the Scheduling team at [phone] if they have not been scheduled within 4 days of  surgery.        Questions or Concerns:    - For any questions regarding the day of surgery or your hospital stay, please contact the Pre Admission Nursing Office at [phone].       - If you have health changes between today and your surgery please call your surgeon.       For questions after surgery please call your surgeons office.

## 2021-05-10 NOTE — PROGRESS NOTES
5/10/2021    Referring Provider: Saranya Higgins MD    Presenting Information:  Yamini Jackson returned to the Cancer Risk Management Program (virtual visit) to discuss her genetic testing results.     Genetic Testing Result: NEGATIVE  No pathogenic mutations, variants of unknown significance, or gross deletions or duplications were detected.       Genes Analyzed (36 total): APC, GOLD, AXIN2, BARD1, BMPR1A, BRCA1, BRCA2, BRIP1, CDH1, CDK4, CDKN2A, CHEK2, DICER1, MLH1, MSH2, MSH3, MSH6, MUTYH, NBN, NF1, NTHL1, PALB2, PMS2, PTEN, RAD51C, RAD51D, RECQL, SMAD4, SMARCA4, STK11 and TP53 (sequencing and deletion/duplication); HOXB13, POLD1 and POLE (sequencing only); EPCAM and GREM1 (deletion/duplication only).     A copy of the test report can be found in the Laboratory tab, dated 4/19/2021.     Interpretation:  We discussed several different interpretations of this negative test result.    1. One explanation may be that there is a different gene or combination of genes and environment that are associated with the cancers in this family.  2. It is possible that a cancer susceptibility gene may be present in Yamini's family which she did not inherit.  3. There is also a small possibility that there is a mutation in one of these genes, and the testing laboratory could not find it with their current testing methods.       Screening:  Based on this negative test result, it is important for Yamini and her relatives to refer back to the family history for appropriate cancer screening.      Yamini's most recent mammogram was 11/25/2019, and colonoscopy was 8/17/2020 (follow up recommended in 5 years due to family history).    Other population cancer screening options, such as those recommended by the American Cancer Society and the National Comprehensive Cancer Network (NCCN), are also appropriate for Yamini and her family. These screening recommendations may change if there are changes to Yamini's personal and/or  family history of cancer. Final screening recommendations should be made by each individual's managing physician.         Inheritance:  We reviewed the autosomal dominant inheritance of mutations in these 36 genes. We discussed that Yamini cannot/did not pass on an identifiable mutation in these genes to her children based on this test result. Mutations in these genes do not skip generations.      Additional Testing Considerations:  Although Yamini's genetic testing result was negative, other relatives may still carry a gene mutation associated with hereditary cancer. Genetic counseling is an available option for her close maternal relatives, as well as her siblings, due to the significant family history of cancer.    Summary:  We do not have an explanation for Yamini's family history of cancer. While no genetic changes were identified, Yamini may still be at risk for certain cancers due to family history, environmental factors, or other genetic causes not identified by this test.  Because of that, it is important that she continue with cancer screening based on her personal and family history as discussed above.    Genetic testing is rapidly advancing, and new cancer susceptibility genes will most likely be identified in the future. Therefore, I encouraged Yamini to contact me annually or if there are changes in her personal or family history. This may change how we assess her cancer risk, screening, and the testing we would offer.    Plan:  1.  I provided Yamini with her test results today.  2. She plans to follow-up with Dr. Higgins and her managing physicians.  3. If Yamini has any further questions, I encouraged her to contact me at [phone].    Time spent face to face: 15 minutes    Deandra Stein MS, [facility] [place]  Licensed, Certified Genetic Counselor  [facility]  Phone: [phone]

## 2021-05-10 NOTE — PROGRESS NOTES
Yamini is a 65 year old who is being evaluated via a billable video visit.      How would you like to obtain your AVS? Mychart      HPI         Review of Systems         Objective    Vitals - Patient Reported  Pain Score: No Pain (0)        Physical Exam           Video-Visit Details    ARACELY Velasquez LPN

## 2021-05-10 NOTE — ANESTHESIA PREPROCEDURE EVALUATION
"Anesthesia Pre-Procedure Evaluation    Patient: Yamini Jackson   MRN: [id number] : 1955        Preoperative Diagnosis: * No surgery found *   Procedure :      No past medical history on file.   No past surgical history on file.   Allergies   Allergen Reactions     Gluten Meal Other (See Comments)     \"doest't eat, gets achy from it\"     Ibuprofen Hives and Itching     PN: LW Reaction: HIVES        Social History     Tobacco Use     Smoking status: Never Smoker     Smokeless tobacco: Never Used   Substance Use Topics     Alcohol use: Not on file      Wt Readings from Last 1 Encounters:   No data found for Wt        Anesthesia Evaluation   Pt has had prior anesthetic. Type: General and MAC.    No history of anesthetic complications       ROS/MED HX  ENT/Pulmonary: Comment: Tonsillectomy    (-) tobacco use and LEA risk factors   Neurologic:  - neg neurologic ROS     Cardiovascular: Comment: Denies cardiac symptoms including chest pain, SOB, palpitations, syncope, SCHULTE, orthopnea, or PND.    (+) hypertension-----Previous cardiac testing   Echo: Date: Results:    Stress Test: Date: Results:    ECG Reviewed: Date: 2020 Results:    Cath: Date: Results:   (-) taking anticoagulants/antiplatelets   METS/Exercise Tolerance: >4 METS    Hematologic:  - neg hematologic  ROS     Musculoskeletal: Comment: Left shoulder impingement syndrome - now resolved.       GI/Hepatic: Comment: Diverticulitis Summer 2020 treated with antibiotics.      (+) GERD, Asymptomatic on medication,     Renal/Genitourinary:  - neg Renal ROS     Endo: Comment: A1C 5.0      Psychiatric/Substance Use:     (+) psychiatric history anxiety (No longer on medication.  Doing well. )  (-) alcohol abuse history and chronic opioid use history   Infectious Disease: Comment: Completed COVID Vaccine - neg infectious disease ROS     Malignancy: Comment: Pelvic mass      Other:  - neg other ROS          Physical Exam    Airway          Neck ROM: full "     Respiratory Devices and Support         Dental           Cardiovascular             Pulmonary                   OUTSIDE LABS:  CBC: No results found for: WBC, HGB, HCT, PLT  BMP: No results found for: NA, POTASSIUM, CHLORIDE, CO2, BUN, CR, GLC  COAGS: No results found for: PTT, INR, FIBR  POC: No results found for: BGM, HCG, HCGS  HEPATIC: No results found for: ALBUMIN, PROTTOTAL, ALT, AST, GGT, ALKPHOS, BILITOTAL, BILIDIRECT, EDWARD  OTHER: No results found for: PH, LACT, A1C, SONDRA, PHOS, MAG, LIPASE, AMYLASE, TSH, T4, T3, CRP, SED          PAC Discussion and Assessment      Case is suitable for: [place]  Anesthetic techniques and relevant risks discussed: GA with regional block for post-op pain control                  PAC Resident/NP Anesthesia Assessment: Type of service:  Video Visit    Patient verbally consented to video service today: YES    Two identifiers used:  yes (name and )    Video Start Time: 9:36  Video End Time (time video stopped): 9:57    Originating Location (pt. Location): Home    Distant Location (provider location):  home    Mode of Communication:  Video Conference via [employer]    Please note, because this was a virtual visit, a full physical could not be completed.  On the DOS, the OOD of anesthesia will complete the appropriate components of the physical exam.    --  Yamini Jackson is 65-year-old female scheduled for HYSTERECTOMY, TOTAL, ROBOT-ASSISTED, WITH BILATERAL SALPINGO-OOPHORECTOMY, AND BILATERAL PELVIC LYMPHADENECTOMY, CYSTOSCOPY on 2021 with Dr. Higgins at [facility] under general anesthesia with block.     Ms. Jackson was seen in the Gynecologic Cancer Clinic at the [facility] on 2021 with Dr. Higgins for evaluation of recent diagnosis of complex left ovarian cyst.  Pelvic ultrasound imaging on 2021 showed progressive enlargement and increasing complex appearance of the left ovarian cyst measuring 7.0 x 2.7 x 4.7 cm.  Her  on  4/9/2021 was 17.  Dr. Higgins counseled Ms. Jackson on treatment.  Ms. Jackson has opted to proceed with the above surgical intervention.      PAC referral for risk assessment and optimization of anesthesia with comorbid conditions including HTN, GERD, diverticulosis and h/o left impingement syndrome of shoulder.     Dx:  Pelvic mass          PROCEDURES     EKG 6/2020     Normal sinus rhythm     Possible Anterior infarct , age undetermined     Abnormal ECG     Compared to ekg of 14-JUN-2012,     No significant change     4/8/2021 Pelvic US ([facility])     COMPARISON:     Pelvic ultrasound dated 07/13/2017 and CT of the pelvis dated 06/14/2020     FINDINGS:       Sonographic images demonstrate a normal size and smooth outer contour of the uterus. Uterus measures 5.4 cm in length by 3.1 cm in AP diameter by 4 point cm in transverse dimension.  The myometrium has a normal uniform echotexture. The endometrial lining appears normal and measures 2.0 mm in composite thickness.        The right ovary appears normal and measures 2.4 x 1.3 x 2.1 cm. There is cystic enlargement of the left ovary which measures 7.0 x 2.7 x 4.7 cm. As compared to the prior sonogram dated 07/13/2017 and the prior CT dated 06/14/2000 the left ovarian cyst has increased in size. Currently it measures 4.7 x 2.8 x 4.3 cm and within one of the components there is layered debris. Previously there was a single thin internal septation and now there are several internal septations. There are no suspicious fluid collections within the cul-de-sac.        IMPRESSION:     Progressive enlargement and increasing complex appearance of the left ovarian cyst. Findings would raise a concern for malignancy and GYN referral recommended.       She has the following specific operative considerations:   - LEA # of risks 2/8 = low  - VTE risk:  0.5-3%  - Risk of PONV score = 2.  If > 2, anti-emetic intervention recommended.      #  Cardiology   - Denies known coronary  artery disease.  Denies cardiac symptoms. METS:  >4. RCRI : No serious cardiac risks.  0.4 % risk of major adverse cardiac event.       - HTN, take metoprolol as prescribed DOS.   - No ASA therapy    #  Pulmonary   - no smoking hx  - Denies pulmonary symptoms  - No inhalers     #  GI   - GERD, take H2B DOS  - h/o Diverticulitis requiring hospitalization 6/14-6/17/20.  Treated with antibiotics without recurrence in symptoms. Reports she had a 20 pound weight loss with this.  Now weight has stablized.     # Gyn  - Pelvic mass with above procedure planned.     #  Musculoskeletal   - Left shoulder impingement syndrome.  Asymptomatic currently.  Consideration for careful positioning.        #  ID  - COVID-19 testing per surgeon's office  - Completed COVID vaccine    # Allergy to Motrin with hives and itching    #   Anesthesia considerations   -  Refer to PAC assessment in anesthesia records      Arrival time, NPO, shower and medication instructions provided by nursing staff today.  Patient is an acceptable candidate for the proposed procedure.  No further diagnostic evaluation is needed.   **For further details of assessment, testing, and physical exam please see H and P completed on same date.      Reviewed and Signed by PAC Mid-Level Provider/Resident  Mid-Level Provider/Resident: Vickie FRANCISCO CNP  Date: 5/10/2021                                 Vickie Simms, MARYAM CNP

## 2021-05-10 NOTE — LETTER
Cancer Risk Management  Program [facility]  [facility]  [facility]  Mailing Address  Cancer Risk Management Program  [facility]  [address]  [place] [address]    New patient appointments  [phone]  May 17, 2021    Yamini Jackson  [address] [facility] [address]      Dear Yamini,    It was a pleasure speaking with you on 5/10/2021. Here is a copy of the progress note from our discussion. If you have any additional questions, please feel free to call.    5/10/2021    Referring Provider: Saranya Higgins MD    Presenting Information:  Yamini Jackson returned to the Cancer Risk Management Program (virtual visit) to discuss her genetic testing results.     Genetic Testing Result: NEGATIVE  No pathogenic mutations, variants of unknown significance, or gross deletions or duplications were detected.       Genes Analyzed (36 total): APC, GOLD, AXIN2, BARD1, BMPR1A, BRCA1, BRCA2, BRIP1, CDH1, CDK4, CDKN2A, CHEK2, DICER1, MLH1, MSH2, MSH3, MSH6, MUTYH, NBN, NF1, NTHL1, PALB2, PMS2, PTEN, RAD51C, RAD51D, RECQL, SMAD4, SMARCA4, STK11 and TP53 (sequencing and deletion/duplication); HOXB13, POLD1 and POLE (sequencing only); EPCAM and GREM1 (deletion/duplication only).     Interpretation:  We discussed several different interpretations of this negative test result.    1. One explanation may be that there is a different gene or combination of genes and environment that are associated with the cancers in this family.  2. It is possible that a cancer susceptibility gene may be present in Yamini's family which she did not inherit.  3. There is also a small possibility that there is a mutation in one of these genes, and the testing laboratory could not find it with their current testing methods.       Screening:  Based  on this negative test result, it is important for Yamini and her relatives to refer back to the family history for appropriate cancer screening.      Yamini's most recent mammogram was 11/25/2019, and colonoscopy was 8/17/2020 (follow up recommended in 5 years due to family history).    Other population cancer screening options, such as those recommended by the American Cancer Society and the National Comprehensive Cancer Network (NCCN), are also appropriate for Yamini and her family. These screening recommendations may change if there are changes to Yamini's personal and/or family history of cancer. Final screening recommendations should be made by each individual's managing physician.         Inheritance:  We reviewed the autosomal dominant inheritance of mutations in these 36 genes. We discussed that Yamini cannot/did not pass on an identifiable mutation in these genes to her children based on this test result. Mutations in these genes do not skip generations.      Additional Testing Considerations:  Although Yamini's genetic testing result was negative, other relatives may still carry a gene mutation associated with hereditary cancer. Genetic counseling is an available option for her close maternal relatives, as well as her siblings, due to the significant family history of cancer.    Summary:  We do not have an explanation for Yamini's family history of cancer. While no genetic changes were identified, Yamini may still be at risk for certain cancers due to family history, environmental factors, or other genetic causes not identified by this test.  Because of that, it is important that she continue with cancer screening based on her personal and family history as discussed above.    Genetic testing is rapidly advancing, and new cancer susceptibility genes will most likely be identified in the future. Therefore, I encouraged Yamini to contact me annually or if there are changes in her personal  or family history. This may change how we assess her cancer risk, screening, and the testing we would offer.    Plan:  1.  I provided Yamini with her test results today.  2. She plans to follow-up with Dr. Higgins and her managing physicians.  3. If Yamini has any further questions, I encouraged her to contact me at [phone].    Deandra Stein MS, [facility] [place] [place]  Licensed, Certified Genetic Counselor  [facility]

## 2021-05-10 NOTE — H&P
Pre-Operative H & P     CC:  Preoperative exam to assess for increased cardiopulmonary risk while undergoing surgery and anesthesia.    Date of Encounter: 5/10/2021  Primary Care Physician:  Jennifer Carr    Type of service:  Video Visit    Patient verbally consented to video service today: YES    Two identifiers used:  yes (name and )    Video Start Time: 9:36  Video End Time (time video stopped): 9:57    Originating Location (pt. Location): Home    Distant Location (provider location):  home    Mode of Communication:  Video Conference via [employer]    Please note, because this was a virtual visit, a full physical could not be completed.  On the DOS, the OOD of anesthesia will complete the appropriate components of the physical exam.      HPI  Yamini Jackson is a 65 year old female who presents for pre-operative H & P in preparation for HYSTERECTOMY, TOTAL, ROBOT-ASSISTED, WITH BILATERAL SALPINGO-OOPHORECTOMY, AND BILATERAL PELVIC LYMPHADENECTOMY, CYSTOSCOPY on 2021 with Dr. Higgins at [facility] under general anesthesia with block.     Ms. Jackson was seen in the Gynecologic Cancer Clinic at the [facility] on 2021 with Dr. Higgins for evaluation of recent diagnosis of complex left ovarian cyst.  Pelvic ultrasound imaging on 2021 showed progressive enlargement and increasing complex appearance of the left ovarian cyst measuring 7.0 x 2.7 x 4.7 cm.  Her  on 2021 was 17.  Dr. Higgins counseled Ms. Jackson on treatment.  Ms. Jackson has opted to proceed with the above surgical intervention.      PAC referral for risk assessment and optimization of anesthesia with comorbid conditions including HTN, GERD, diverticulosis and h/o left impingement syndrome of shoulder.     Dx:  Pelvic mass      History is obtained from the patient and electronic health record.     Past Medical History  Past Medical History:   Diagnosis Date     Diverticulitis 2020      "Gastroesophageal reflux disease      Hypertension      Pelvic mass        Past Surgical History  Past Surgical History:   Procedure Laterality Date     AS RAD RESEC TONSIL/PILLARS       cervical polypectomy      X2     COLONOSCOPY       dental extractions         Hx of Blood transfusions/reactions: denies     Hx of abnormal bleeding or anti-platelet use: denies    Menstrual history: No LMP recorded. Patient is postmenopausal.    Steroid use in the last year: denies    Personal or FH with difficulty with Anesthesia:  denies    Prior to Admission Medications  Current Outpatient Medications   Medication Sig Dispense Refill     calcium citrate (CITRACAL) 950 (200 Ca) MG tablet Take 1 tablet by mouth 3 times daily       calcium-vitamin D-vitamin K (VIACTIV) 500-500-40 MG-UNT-MCG CHEW Take 1 tablet by mouth three times a week       COLOSTRUM PO Take by mouth 2 times daily       famotidine (PEPCID) 10 MG tablet Take 10 mg by mouth every morning        magnesium 250 MG tablet Take 1 tablet by mouth daily (with lunch)       metoprolol succinate ER (TOPROL-XL) 25 MG 24 hr tablet Take 25 mg by mouth every morning        potassium chloride ER (KLOR-CON M15) 15 MEQ CR tablet Take 15 mEq by mouth 3 times daily (with meals)       omeprazole (PRILOSEC) 20 MG DR capsule Take 20 mg by mouth         Allergies  Allergies   Allergen Reactions     Gluten Meal Other (See Comments)     \"doest't eat, gets achy from it\"     Ibuprofen Hives and Itching     PN: LW Reaction: HIVES         Social History  Social History     Socioeconomic History     Marital status:      Spouse name: Not on file     Number of children: Not on file     Years of education: Not on file     Highest education level: Not on file   Occupational History     Not on file   Social Needs     Financial resource strain: Not on file     Food insecurity     Worry: Not on file     Inability: Not on file     Transportation needs     Medical: Not on file     Non-medical: Not " on file   Tobacco Use     Smoking status: Never Smoker     Smokeless tobacco: Never Used   Substance and Sexual Activity     Alcohol use: Not Currently     Drug use: Never     Sexual activity: Not on file   Lifestyle     Physical activity     Days per week: Not on file     Minutes per session: Not on file     Stress: Not on file   Relationships     Social connections     Talks on phone: Not on file     Gets together: Not on file     Attends Yazidism service: Not on file     Active member of club or organization: Not on file     Attends meetings of clubs or organizations: Not on file     Relationship status: Not on file     Intimate partner violence     Fear of current or ex partner: Not on file     Emotionally abused: Not on file     Physically abused: Not on file     Forced sexual activity: Not on file   Other Topics Concern     Not on file   Social History Narrative     Not on file       Family History  Family History   Problem Relation Age of Onset     Kidney failure Mother      Pneumonia Mother      Alzheimer Disease Father      Coronary Artery Disease Father          ROS/MED HX  ENT/Pulmonary: Comment: Tonsillectomy    (-) tobacco use and LEA risk factors   Neurologic:  - neg neurologic ROS     Cardiovascular: Comment: Denies cardiac symptoms including chest pain, SOB, palpitations, syncope, SHCULTE, orthopnea, or PND.    (+) hypertension-----Previous cardiac testing   Echo: Date: Results:    Stress Test: Date: Results:    ECG Reviewed: Date: 6/2020 Results:    Cath: Date: Results:   (-) taking anticoagulants/antiplatelets   METS/Exercise Tolerance: >4 METS    Hematologic:  - neg hematologic  ROS     Musculoskeletal: Comment: Left shoulder impingement syndrome - now resolved.       GI/Hepatic: Comment: Diverticulitis Summer 2020 treated with antibiotics.      (+) GERD, Asymptomatic on medication,     Renal/Genitourinary:  - neg Renal ROS     Endo: Comment: A1C 5.0      Psychiatric/Substance Use:     (+)  psychiatric history anxiety (No longer on medication.  Doing well. )  (-) alcohol abuse history and chronic opioid use history   Infectious Disease: Comment: Completed COVID Vaccine - neg infectious disease ROS     Malignancy: Comment: Pelvic mass      Other:  - neg other ROS          No vital signs taken since this is a virtual visit.       Physical Exam  Constitutional: Awake, alert, cooperative, no apparent distress, and appears stated age.  HENT: Normocephalic   Respiratory: Regular respiratory rate.  Effort is non-labored, quiet, easy breathing.   Musculoskeletal:  Full ROM of neck.   Neurologic: Awake, alert, oriented to name, place and time.   Neuropsychiatric: Calm, cooperative. Normal affect.     **Please note, because this was a virtual visit due to COVID -19 pandemic, a full physical could not be completed.  On the DOS, the OOD of anesthesia will complete the appropriate components of the physical exam. Please refer to the physical examination documented by the anesthesiologist in the anesthesia record on the day of surgery. **    Labs: (personally reviewed)  Care Everywhere  9/17/2020  WBC 4.7  Hgb 14.0  Plts 182  4/26/2021  Na+ 141  K+ 4.0  Cr 19  GFR>60    PROCEDURES     EKG 6/2020     Normal sinus rhythm     Possible Anterior infarct , age undetermined     Abnormal ECG     Compared to ekg of 14-JUN-2012,     No significant change     4/8/2021 Pelvic US ([facility])     COMPARISON:     Pelvic ultrasound dated 07/13/2017 and CT of the pelvis dated 06/14/2020     FINDINGS:       Sonographic images demonstrate a normal size and smooth outer contour of the uterus. Uterus measures 5.4 cm in length by 3.1 cm in AP diameter by 4 point cm in transverse dimension.  The myometrium has a normal uniform echotexture. The endometrial lining appears normal and measures 2.0 mm in composite thickness.        The right ovary appears normal and measures 2.4 x 1.3 x 2.1 cm. There is cystic enlargement of the left ovary  which measures 7.0 x 2.7 x 4.7 cm. As compared to the prior sonogram dated 07/13/2017 and the prior CT dated 06/14/2000 the left ovarian cyst has increased in size. Currently it measures 4.7 x 2.8 x 4.3 cm and within one of the components there is layered debris. Previously there was a single thin internal septation and now there are several internal septations. There are no suspicious fluid collections within the cul-de-sac.        IMPRESSION:     Progressive enlargement and increasing complex appearance of the left ovarian cyst. Findings would raise a concern for malignancy and GYN referral recommended.       Outside records reviewed from: Care Everywhere  LABS/DIAGNOSTIC STUDIES:   All labs and imaging personally reviewed     ASSESSMENT and PLAN  Yamini Jackson is a 65 year old female scheduled to undergo  HYSTERECTOMY, TOTAL, ROBOT-ASSISTED, WITH BILATERAL SALPINGO-OOPHORECTOMY, AND BILATERAL PELVIC LYMPHADENECTOMY, CYSTOSCOPY on 5/19/2021 with Dr. Higgins at [facility] under general anesthesia with block.     She has the following specific operative considerations:   - LEA # of risks 2/8 = low  - VTE risk:  0.5-3%  - Risk of PONV score = 2.  If > 2, anti-emetic intervention recommended.      #  Cardiology   - Denies known coronary artery disease.  Denies cardiac symptoms. METS:  >4. RCRI : No serious cardiac risks.  0.4 % risk of major adverse cardiac event.       - HTN, take metoprolol as prescribed DOS.   - No ASA therapy    #  Pulmonary   - no smoking hx  - Denies pulmonary symptoms  - No inhalers     #  GI   - GERD, take H2B DOS  - h/o Diverticulitis requiring hospitalization 6/14-6/17/20.  Treated with antibiotics without recurrence in symptoms. Reports she had a 20 pound weight loss with this.  Now weight has stablized.     # Gyn  - Pelvic mass with above procedure planned. Preoperative labs ordered.     #  Musculoskeletal   - Left shoulder impingement syndrome.  Asymptomatic currently.   Consideration for careful positioning.        #  ID  - COVID-19 testing per surgeon's office  - Completed COVID vaccine    # Allergy to Motrin with hives and itching    #   Anesthesia considerations   -  Refer to PAC assessment in anesthesia records      Arrival time, NPO, shower and medication instructions provided by nursing staff today.  Patient is an acceptable candidate for the proposed procedure.  No further diagnostic evaluation is needed.     MARYAM Mcintosh CNP  Preoperative Assessment Center  [facility]  Phone: [phone]  Fax: [phone]

## 2021-05-13 ENCOUNTER — ONCOLOGY VISIT (OUTPATIENT)
Dept: ONCOLOGY | Facility: CLINIC | Age: 66
End: 2021-05-13
Attending: OBSTETRICS & GYNECOLOGY
Payer: COMMERCIAL

## 2021-05-13 VITALS
SYSTOLIC BLOOD PRESSURE: 130 MMHG | WEIGHT: 117.4 LBS | DIASTOLIC BLOOD PRESSURE: 80 MMHG | TEMPERATURE: 98 F | HEIGHT: 63 IN | RESPIRATION RATE: 16 BRPM | OXYGEN SATURATION: 100 % | BODY MASS INDEX: 20.8 KG/M2 | HEART RATE: 71 BPM

## 2021-05-13 DIAGNOSIS — R19.00 PELVIC MASS: Primary | ICD-10-CM

## 2021-05-13 PROCEDURE — G0463 HOSPITAL OUTPT CLINIC VISIT: HCPCS

## 2021-05-13 PROCEDURE — 99215 OFFICE O/P EST HI 40 MIN: CPT | Mod: GT | Performed by: OBSTETRICS & GYNECOLOGY

## 2021-05-13 ASSESSMENT — PAIN SCALES - GENERAL: PAINLEVEL: NO PAIN (0)

## 2021-05-13 ASSESSMENT — MIFFLIN-ST. JEOR: SCORE: 1060.9

## 2021-05-13 NOTE — NURSING NOTE
"Oncology Rooming Note    May 13, 2021 9:51 AM   Yamini Jackson is a 65 year old female who presents for:    Chief Complaint   Patient presents with     Oncology Clinic Visit     RETURN; PELVIC MASS     Initial Vitals: /80 (BP Location: Right arm, Patient Position: Sitting, Cuff Size: Adult Regular)   Pulse 71   Temp 98  F (36.7  C) (Oral)   Resp 16   Ht 1.607 m (5' 3.27\")   Wt 53.3 kg (117 lb 6.4 oz)   SpO2 100%   BMI 20.62 kg/m   Estimated body mass index is 20.62 kg/m  as calculated from the following:    Height as of this encounter: 1.607 m (5' 3.27\").    Weight as of this encounter: 53.3 kg (117 lb 6.4 oz). Body surface area is 1.54 meters squared.  No Pain (0) Comment: Data Unavailable   No LMP recorded. Patient is postmenopausal.  Allergies reviewed: Yes  Medications reviewed: Yes    Medications: Medication refills not needed today.  Pharmacy name entered into Jaco Solarsi: [id number] - [place], [place] - [address]    Clinical concerns: surgery.        Magi Garner CMA              "

## 2021-05-13 NOTE — LETTER
2021         RE: Yamini Jackson  [address]  [place] [address]        Dear Colleague,    Thank you for referring your patient, Yamini Jackson, to the [facility]. Please see a copy of my visit note below.    Yamini is a 65 year old who is being evaluated via a billable video visit.      How would you like to obtain your AVS? MyChart     If the video visit is dropped, the invitation should be resent by: Text to cell phone: [phone]     Will anyone else be joining your video visit? No          Vitals - Patient Reported  Pain Score: No Pain (0)    Ryan Ledezma LPN    Video Start Time: 1048  Video-Visit Details    Type of service:  Video Visit    Video End Time:1138    Originating Location (pt. Location): Home    Distant Location (provider location):  [facility]     Platform used for Video Visit: Well                              Consult Notes on Referred Patient    Date: 21       Dr. Sanchez referring provider defined for this encounter.       RE: Yamini Jackson  : 1955  DAVID: 21    Dear Dr. Del Valle,    I had the pleasure of seeing your patient Yamini Jackson here at the Gynecologic Cancer Clinic at the [facility] on 21  As you know she is a very pleasant 65 year old woman with a recent diagnosis of complex left ovarian cyst.  Given these findings she was subsequently sent to the Gynecologic Cancer Clinic for new patient consultation.     2020 CT Ab/Pelv:  Pelvis: A 4 cm simple appearing cyst is in the left adnexa on series 2, image 117. Remainder of the pelvis is unremarkable.     2021 Pelvic US ([facility])  COMPARISON:  Pelvic ultrasound dated 2017 and CT of the pelvis dated 2020  FINDINGS:    Sonographic images demonstrate a normal size and smooth outer contour of the uterus. Uterus measures 5.4 cm in length by 3.1 cm in AP diameter by 4 point cm in  transverse dimension.  The myometrium has a normal uniform echotexture. The endometrial lining appears normal and measures 2.0 mm in composite thickness.         The right ovary appears normal and measures 2.4 x 1.3 x 2.1 cm. There is cystic enlargement of the left ovary which measures 7.0 x 2.7 x 4.7 cm. As compared to the prior sonogram dated 2017 and the prior CT dated 2000 the left ovarian cyst has increased in size. Currently it measures 4.7 x 2.8 x 4.3 cm and within one of the components there is layered debris. Previously there was a single thin internal septation and now there are several internal septations. There are no suspicious fluid collections within the cul-de-sac.         IMPRESSION:    Progressive enlargement and increasing complex appearance of the left ovarian cyst. Findings would raise a concern for malignancy and GYN referral recommended.      2021:  17      Simons syndrome testing negative.    Today: Patient is a .       Review of Systems:    I have reviewed the pertinent positive findings in the review of symptoms with the patient.        Past Medical History:    No heart disease or lung dz  HTN   x 3    Past Surgical History:    Cervix polyp removed and endometrial polyp removed.  No abdominal surgery    Health Maintenance:  Health Maintenance Due   Topic Date Due     PREVENTIVE CARE VISIT  Never done     ADVANCE CARE PLANNING  Never done     MAMMO SCREENING  Never done     COLORECTAL CANCER SCREENING  Never done     HIV SCREENING  Never done     HEPATITIS C SCREENING  Never done     PAP  Never done     DTAP/TDAP/TD IMMUNIZATION (1 - Tdap) Never done     LIPID  Never done     ZOSTER IMMUNIZATION (1 of 2) Never done       Last Pap Smear:               Result: normal   Had one abnormal pap long ago-repeated and was normal.    Last Mammogram: 2019            Result: abnormal: followed up with US      She has had a history of abnormal mammograms.    Last  "Colonoscopy: 2020              Result: abnormal: diverticulosis done every 5 years.              Current Medications:     has a current medication list which includes the following prescription(s): calcium citrate, colostrum, famotidine, magnesium, metoprolol succinate er, and potassium chloride er.       Allergies:     Allergies   Allergen Reactions     Gluten Meal Other (See Comments)     \"doest't eat, gets achy from it\"     Ibuprofen Hives and Itching     PN: LW Reaction: HIVES              Social History:     Social History     Tobacco Use     Smoking status: Never Smoker     Smokeless tobacco: Never Used   Substance Use Topics     Alcohol use: Not Currently       History   Drug Use Unknown           Family History:     The patient's family history is notable for the following    No uterine cancer in family.  No breast, ovarian  Colon cancer-mother age 63 right sided colon ca, aunt early 70's (maternal aunt), another maternal aunt with \"cancer of the omentum\"  age 61.    Physical Exam:     /80 (BP Location: Right arm, Patient Position: Sitting, Cuff Size: Adult Regular)   Pulse 71   Temp 98  F (36.7  C) (Oral)   Resp 16   Ht 1.607 m (5' 3.27\")   Wt 53.3 kg (117 lb 6.4 oz)   SpO2 100%   BMI 20.62 kg/m    Body mass index is 20.62 kg/m .    General Appearance: healthy and alert, no distress     No physical exam or VS done due to virtual visit because of COVID 19.    : Palpable mass in left adenxa, firm immobile on rectal exam.        Assessment:    Yamini Jackson is a 65 year old woman with a new diagnosis of complex ovarian mass            Plan:     1.)    Plan robotic surgery to remove complex ovarian mass on left and both fallopian tubes based on complex ovarian mass that is increasing in size.  -If ovary is benign would like to keep other ovary and uterus and cervix.  -If LMP tumor, recommend TLH/BSO/omentectomy  -If cancer will perform TLH/BSO/staging/lymph node dissection in addition " to the above.     2.) Genetic risk factors were assessed and is negative for Simons syndrome. Testing recently done.    3.) Labs and/or tests ordered include:  Pre op exam, consult with GC     4.) Health maintenance issues addressed today include UTD    5.) Pre-op teaching was completed today.  Risks of surgery were discussed to include: bleeding, transfusion, infection, unintentional injury to surrounding organs/structures.      Thank you for allowing us to participate in the care of your patient.         Sincerely,  Saranya Higgins MD    Department of Ob/Gyn and Women's Health  Division of Gynecologic Oncology  [facility]  [phone]    Of a 50 minute appointment, more than 50% was spent in counseling the patient.    CC  Patient Care Team:  Jennifer Carr MD as PCP - General  Anitra Del Valle MD as MD (OB/Gyn)  Deandra Stein GC as Genetic Counselor (Genetic )

## 2021-05-13 NOTE — PROGRESS NOTES
Yamini is a 65 year old who is being evaluated via a billable video visit.      How would you like to obtain your AVS? MyChart     If the video visit is dropped, the invitation should be resent by: Text to cell phone: [phone]     Will anyone else be joining your video visit? No          Vitals - Patient Reported  Pain Score: No Pain (0)    Ryan Ledezma LPN    Video Start Time: 1048  Video-Visit Details    Type of service:  Video Visit    Video End Time:1138    Originating Location (pt. Location): Home    Distant Location (provider location):  [facility]     Platform used for Video Visit: Well                              Consult Notes on Referred Patient    Date: 21       Dr. Sanchez referring provider defined for this encounter.       RE: Yamini Jackson  : 1955  DAVID: 21    Dear Dr. Del Valle,    I had the pleasure of seeing your patient Yamini Jackson here at the Gynecologic Cancer Clinic at the [facility] on 21  As you know she is a very pleasant 65 year old woman with a recent diagnosis of complex left ovarian cyst.  Given these findings she was subsequently sent to the Gynecologic Cancer Clinic for new patient consultation.     2020 CT Ab/Pelv:  Pelvis: A 4 cm simple appearing cyst is in the left adnexa on series 2, image 117. Remainder of the pelvis is unremarkable.     2021 Pelvic US ([facility])  COMPARISON:  Pelvic ultrasound dated 2017 and CT of the pelvis dated 2020  FINDINGS:    Sonographic images demonstrate a normal size and smooth outer contour of the uterus. Uterus measures 5.4 cm in length by 3.1 cm in AP diameter by 4 point cm in transverse dimension.  The myometrium has a normal uniform echotexture. The endometrial lining appears normal and measures 2.0 mm in composite thickness.         The right ovary appears normal and measures 2.4 x 1.3 x 2.1 cm. There is cystic enlargement of the left ovary  which measures 7.0 x 2.7 x 4.7 cm. As compared to the prior sonogram dated 2017 and the prior CT dated 2000 the left ovarian cyst has increased in size. Currently it measures 4.7 x 2.8 x 4.3 cm and within one of the components there is layered debris. Previously there was a single thin internal septation and now there are several internal septations. There are no suspicious fluid collections within the cul-de-sac.         IMPRESSION:    Progressive enlargement and increasing complex appearance of the left ovarian cyst. Findings would raise a concern for malignancy and GYN referral recommended.      2021:  17      Simons syndrome testing negative.    Today: Patient is a .       Review of Systems:    I have reviewed the pertinent positive findings in the review of symptoms with the patient.        Past Medical History:    No heart disease or lung dz  HTN   x 3    Past Surgical History:    Cervix polyp removed and endometrial polyp removed.  No abdominal surgery    Health Maintenance:  Health Maintenance Due   Topic Date Due     PREVENTIVE CARE VISIT  Never done     ADVANCE CARE PLANNING  Never done     MAMMO SCREENING  Never done     COLORECTAL CANCER SCREENING  Never done     HIV SCREENING  Never done     HEPATITIS C SCREENING  Never done     PAP  Never done     DTAP/TDAP/TD IMMUNIZATION (1 - Tdap) Never done     LIPID  Never done     ZOSTER IMMUNIZATION (1 of 2) Never done       Last Pap Smear:               Result: normal   Had one abnormal pap long ago-repeated and was normal.    Last Mammogram: 2019            Result: abnormal: followed up with US      She has had a history of abnormal mammograms.    Last Colonoscopy: 2020              Result: abnormal: diverticulosis done every 5 years.              Current Medications:     has a current medication list which includes the following prescription(s): calcium citrate, colostrum, famotidine, magnesium, metoprolol succinate  "er, and potassium chloride er.       Allergies:     Allergies   Allergen Reactions     Gluten Meal Other (See Comments)     \"doest't eat, gets achy from it\"     Ibuprofen Hives and Itching     PN: LW Reaction: HIVES              Social History:     Social History     Tobacco Use     Smoking status: Never Smoker     Smokeless tobacco: Never Used   Substance Use Topics     Alcohol use: Not Currently       History   Drug Use Unknown           Family History:     The patient's family history is notable for the following    No uterine cancer in family.  No breast, ovarian  Colon cancer-mother age 63 right sided colon ca, aunt early 70's (maternal aunt), another maternal aunt with \"cancer of the omentum\"  age 61.    Physical Exam:     /80 (BP Location: Right arm, Patient Position: Sitting, Cuff Size: Adult Regular)   Pulse 71   Temp 98  F (36.7  C) (Oral)   Resp 16   Ht 1.607 m (5' 3.27\")   Wt 53.3 kg (117 lb 6.4 oz)   SpO2 100%   BMI 20.62 kg/m    Body mass index is 20.62 kg/m .    General Appearance: healthy and alert, no distress     No physical exam or VS done due to virtual visit because of COVID 19.    : Palpable mass in left adenxa, firm immobile on rectal exam.        Assessment:    Yamini Jackson is a 65 year old woman with a new diagnosis of complex ovarian mass            Plan:     1.)    Plan robotic surgery to remove complex ovarian mass on left and both fallopian tubes based on complex ovarian mass that is increasing in size.  -If ovary is benign would like to keep other ovary and uterus and cervix.  -If LMP tumor, recommend TLH/BSO/omentectomy  -If cancer will perform TLH/BSO/staging/lymph node dissection in addition to the above.     2.) Genetic risk factors were assessed and is negative for Simons syndrome. Testing recently done.    3.) Labs and/or tests ordered include:  Pre op exam, consult with GC     4.) Health maintenance issues addressed today include UTD    5.) Pre-op teaching " was completed today.  Risks of surgery were discussed to include: bleeding, transfusion, infection, unintentional injury to surrounding organs/structures.      Thank you for allowing us to participate in the care of your patient.         Sincerely,  Saranya Higgins MD    Department of Ob/Gyn and Women's Health  Division of Gynecologic Oncology  [facility]  [phone]    Of a 50 minute appointment, more than 50% was spent in counseling the patient.    CC  Patient Care Team:  Jennifer Carr MD as PCP - General  Saranya Higgins MD as MD (Gynecologic Oncology)  Anitra Del Valle MD as MD (OB/Gyn)  Deandra Stein GC as Genetic Counselor (Genetic )  Saranya Higgins MD as Assigned Cancer Care Provider

## 2021-05-17 ENCOUNTER — ANESTHESIA EVENT (OUTPATIENT)
Dept: SURGERY | Facility: CLINIC | Age: 66
End: 2021-05-17
Payer: COMMERCIAL

## 2021-05-17 DIAGNOSIS — R19.00 PELVIC MASS: ICD-10-CM

## 2021-05-17 DIAGNOSIS — Z11.59 ENCOUNTER FOR SCREENING FOR OTHER VIRAL DISEASES: ICD-10-CM

## 2021-05-17 LAB
ABO + RH BLD: NORMAL
ABO + RH BLD: NORMAL
BASOPHILS # BLD AUTO: 0 10E9/L (ref 0–0.2)
BASOPHILS NFR BLD AUTO: 0.3 %
BLD GP AB SCN SERPL QL: NORMAL
BLOOD BANK CMNT PATIENT-IMP: NORMAL
DIFFERENTIAL METHOD BLD: NORMAL
EOSINOPHIL # BLD AUTO: 0.1 10E9/L (ref 0–0.7)
EOSINOPHIL NFR BLD AUTO: 1.9 %
ERYTHROCYTE [DISTWIDTH] IN BLOOD BY AUTOMATED COUNT: 12.6 % (ref 10–15)
HCT VFR BLD AUTO: 38.9 % (ref 35–47)
HGB BLD-MCNC: 13.4 G/DL (ref 11.7–15.7)
LABORATORY COMMENT REPORT: NORMAL
LYMPHOCYTES # BLD AUTO: 1.3 10E9/L (ref 0.8–5.3)
LYMPHOCYTES NFR BLD AUTO: 21 %
MCH RBC QN AUTO: 31.9 PG (ref 26.5–33)
MCHC RBC AUTO-ENTMCNC: 34.4 G/DL (ref 31.5–36.5)
MCV RBC AUTO: 93 FL (ref 78–100)
MONOCYTES # BLD AUTO: 0.5 10E9/L (ref 0–1.3)
MONOCYTES NFR BLD AUTO: 7.7 %
NEUTROPHILS # BLD AUTO: 4.4 10E9/L (ref 1.6–8.3)
NEUTROPHILS NFR BLD AUTO: 69.1 %
PLATELET # BLD AUTO: 186 10E9/L (ref 150–450)
RBC # BLD AUTO: 4.2 10E12/L (ref 3.8–5.2)
SARS-COV-2 RNA RESP QL NAA+PROBE: NEGATIVE
SARS-COV-2 RNA RESP QL NAA+PROBE: NORMAL
SPECIMEN EXP DATE BLD: NORMAL
SPECIMEN SOURCE: NORMAL
SPECIMEN SOURCE: NORMAL
WBC # BLD AUTO: 6.3 10E9/L (ref 4–11)

## 2021-05-17 PROCEDURE — 86900 BLOOD TYPING SEROLOGIC ABO: CPT | Performed by: NURSE PRACTITIONER

## 2021-05-17 PROCEDURE — U0003 INFECTIOUS AGENT DETECTION BY NUCLEIC ACID (DNA OR RNA); SEVERE ACUTE RESPIRATORY SYNDROME CORONAVIRUS 2 (SARS-COV-2) (CORONAVIRUS DISEASE [COVID-19]), AMPLIFIED PROBE TECHNIQUE, MAKING USE OF HIGH THROUGHPUT TECHNOLOGIES AS DESCRIBED BY CMS-2020-01-R: HCPCS | Performed by: OBSTETRICS & GYNECOLOGY

## 2021-05-17 PROCEDURE — 85025 COMPLETE CBC W/AUTO DIFF WBC: CPT | Performed by: NURSE PRACTITIONER

## 2021-05-17 PROCEDURE — 36415 COLL VENOUS BLD VENIPUNCTURE: CPT | Performed by: NURSE PRACTITIONER

## 2021-05-17 PROCEDURE — 86850 RBC ANTIBODY SCREEN: CPT | Performed by: NURSE PRACTITIONER

## 2021-05-17 PROCEDURE — 86901 BLOOD TYPING SEROLOGIC RH(D): CPT | Performed by: NURSE PRACTITIONER

## 2021-05-17 PROCEDURE — U0005 INFEC AGEN DETEC AMPLI PROBE: HCPCS | Performed by: OBSTETRICS & GYNECOLOGY

## 2021-05-19 ENCOUNTER — ANESTHESIA (OUTPATIENT)
Dept: SURGERY | Facility: CLINIC | Age: 66
End: 2021-05-19
Payer: COMMERCIAL

## 2021-05-19 ENCOUNTER — HOSPITAL ENCOUNTER (OUTPATIENT)
Facility: CLINIC | Age: 66
Discharge: HOME | End: 2021-05-19
Attending: OBSTETRICS & GYNECOLOGY | Admitting: OBSTETRICS & GYNECOLOGY
Payer: COMMERCIAL

## 2021-05-19 VITALS
WEIGHT: 117.06 LBS | SYSTOLIC BLOOD PRESSURE: 145 MMHG | HEIGHT: 64 IN | RESPIRATION RATE: 16 BRPM | HEART RATE: 66 BPM | DIASTOLIC BLOOD PRESSURE: 95 MMHG | BODY MASS INDEX: 19.99 KG/M2 | TEMPERATURE: 97.7 F | OXYGEN SATURATION: 100 %

## 2021-05-19 DIAGNOSIS — R19.00 PELVIC MASS: ICD-10-CM

## 2021-05-19 LAB — GLUCOSE BLDC GLUCOMTR-MCNC: 88 MG/DL (ref 70–99)

## 2021-05-19 PROCEDURE — 88305 TISSUE EXAM BY PATHOLOGIST: CPT | Mod: 26 | Performed by: PATHOLOGY

## 2021-05-19 PROCEDURE — 250N000011 HC RX IP 250 OP 636: Performed by: ANESTHESIOLOGY

## 2021-05-19 PROCEDURE — 250N000011 HC RX IP 250 OP 636: Performed by: OBSTETRICS & GYNECOLOGY

## 2021-05-19 PROCEDURE — 250N000009 HC RX 250: Performed by: NURSE ANESTHETIST, CERTIFIED REGISTERED

## 2021-05-19 PROCEDURE — 82962 GLUCOSE BLOOD TEST: CPT

## 2021-05-19 PROCEDURE — 88307 TISSUE EXAM BY PATHOLOGIST: CPT | Mod: 26 | Performed by: PATHOLOGY

## 2021-05-19 PROCEDURE — 360N000080 HC SURGERY LEVEL 7, PER MIN: Performed by: OBSTETRICS & GYNECOLOGY

## 2021-05-19 PROCEDURE — 272N000001 HC OR GENERAL SUPPLY STERILE: Performed by: OBSTETRICS & GYNECOLOGY

## 2021-05-19 PROCEDURE — 250N000025 HC SEVOFLURANE, PER MIN: Performed by: OBSTETRICS & GYNECOLOGY

## 2021-05-19 PROCEDURE — 258N000003 HC RX IP 258 OP 636: Performed by: NURSE ANESTHETIST, CERTIFIED REGISTERED

## 2021-05-19 PROCEDURE — 88331 PATH CONSLTJ SURG 1 BLK 1SPC: CPT | Mod: TC | Performed by: OBSTETRICS & GYNECOLOGY

## 2021-05-19 PROCEDURE — 999N000141 HC STATISTIC PRE-PROCEDURE NURSING ASSESSMENT: Performed by: OBSTETRICS & GYNECOLOGY

## 2021-05-19 PROCEDURE — 88302 TISSUE EXAM BY PATHOLOGIST: CPT | Mod: TC | Performed by: OBSTETRICS & GYNECOLOGY

## 2021-05-19 PROCEDURE — 710N000009 HC RECOVERY PHASE 1, LEVEL 1, PER MIN: Performed by: OBSTETRICS & GYNECOLOGY

## 2021-05-19 PROCEDURE — 258N000003 HC RX IP 258 OP 636: Performed by: STUDENT IN AN ORGANIZED HEALTH CARE EDUCATION/TRAINING PROGRAM

## 2021-05-19 PROCEDURE — 88112 CYTOPATH CELL ENHANCE TECH: CPT | Mod: TC | Performed by: OBSTETRICS & GYNECOLOGY

## 2021-05-19 PROCEDURE — 370N000017 HC ANESTHESIA TECHNICAL FEE, PER MIN: Performed by: OBSTETRICS & GYNECOLOGY

## 2021-05-19 PROCEDURE — 88331 PATH CONSLTJ SURG 1 BLK 1SPC: CPT | Mod: 26 | Performed by: PATHOLOGY

## 2021-05-19 PROCEDURE — 250N000009 HC RX 250: Performed by: STUDENT IN AN ORGANIZED HEALTH CARE EDUCATION/TRAINING PROGRAM

## 2021-05-19 PROCEDURE — 710N000012 HC RECOVERY PHASE 2, PER MINUTE: Performed by: OBSTETRICS & GYNECOLOGY

## 2021-05-19 PROCEDURE — 88307 TISSUE EXAM BY PATHOLOGIST: CPT | Mod: TC | Performed by: OBSTETRICS & GYNECOLOGY

## 2021-05-19 PROCEDURE — 250N000011 HC RX IP 250 OP 636: Performed by: STUDENT IN AN ORGANIZED HEALTH CARE EDUCATION/TRAINING PROGRAM

## 2021-05-19 PROCEDURE — 250N000013 HC RX MED GY IP 250 OP 250 PS 637: Performed by: STUDENT IN AN ORGANIZED HEALTH CARE EDUCATION/TRAINING PROGRAM

## 2021-05-19 PROCEDURE — 250N000011 HC RX IP 250 OP 636: Performed by: NURSE ANESTHETIST, CERTIFIED REGISTERED

## 2021-05-19 PROCEDURE — 250N000013 HC RX MED GY IP 250 OP 250 PS 637: Performed by: OBSTETRICS & GYNECOLOGY

## 2021-05-19 PROCEDURE — 999N001018 HC STATISTIC H-CELL BLOCK W/STAIN: Performed by: OBSTETRICS & GYNECOLOGY

## 2021-05-19 PROCEDURE — 88305 TISSUE EXAM BY PATHOLOGIST: CPT | Mod: TC | Performed by: OBSTETRICS & GYNECOLOGY

## 2021-05-19 PROCEDURE — 88302 TISSUE EXAM BY PATHOLOGIST: CPT | Mod: 26 | Performed by: PATHOLOGY

## 2021-05-19 PROCEDURE — 88112 CYTOPATH CELL ENHANCE TECH: CPT | Mod: 26 | Performed by: PATHOLOGY

## 2021-05-19 RX ORDER — PHENAZOPYRIDINE HYDROCHLORIDE 200 MG/1
200 TABLET, FILM COATED ORAL ONCE
Status: COMPLETED | OUTPATIENT
Start: 2021-05-19 | End: 2021-05-19

## 2021-05-19 RX ORDER — HYDROMORPHONE HYDROCHLORIDE 1 MG/ML
.3-.5 INJECTION, SOLUTION INTRAMUSCULAR; INTRAVENOUS; SUBCUTANEOUS EVERY 10 MIN PRN
Status: DISCONTINUED | OUTPATIENT
Start: 2021-05-19 | End: 2021-05-19 | Stop reason: HOSPADM

## 2021-05-19 RX ORDER — ONDANSETRON 4 MG/1
4-8 TABLET, ORALLY DISINTEGRATING ORAL EVERY 8 HOURS PRN
Qty: 4 TABLET | Refills: 0 | Status: SHIPPED | OUTPATIENT
Start: 2021-05-19

## 2021-05-19 RX ORDER — ALBUTEROL SULFATE 0.83 MG/ML
2.5 SOLUTION RESPIRATORY (INHALATION) EVERY 4 HOURS PRN
Status: DISCONTINUED | OUTPATIENT
Start: 2021-05-19 | End: 2021-05-19 | Stop reason: HOSPADM

## 2021-05-19 RX ORDER — ONDANSETRON 4 MG/1
4 TABLET, ORALLY DISINTEGRATING ORAL EVERY 30 MIN PRN
Status: DISCONTINUED | OUTPATIENT
Start: 2021-05-19 | End: 2021-05-19 | Stop reason: HOSPADM

## 2021-05-19 RX ORDER — ONDANSETRON 2 MG/ML
INJECTION INTRAMUSCULAR; INTRAVENOUS PRN
Status: DISCONTINUED | OUTPATIENT
Start: 2021-05-19 | End: 2021-05-19

## 2021-05-19 RX ORDER — PROPOFOL 10 MG/ML
INJECTION, EMULSION INTRAVENOUS PRN
Status: DISCONTINUED | OUTPATIENT
Start: 2021-05-19 | End: 2021-05-19

## 2021-05-19 RX ORDER — FENTANYL CITRATE 50 UG/ML
25-50 INJECTION, SOLUTION INTRAMUSCULAR; INTRAVENOUS
Status: DISCONTINUED | OUTPATIENT
Start: 2021-05-19 | End: 2021-05-19 | Stop reason: HOSPADM

## 2021-05-19 RX ORDER — FENTANYL CITRATE 50 UG/ML
25-50 INJECTION, SOLUTION INTRAMUSCULAR; INTRAVENOUS EVERY 5 MIN PRN
Status: DISCONTINUED | OUTPATIENT
Start: 2021-05-19 | End: 2021-05-19 | Stop reason: HOSPADM

## 2021-05-19 RX ORDER — OXYCODONE HYDROCHLORIDE 5 MG/1
5 TABLET ORAL EVERY 6 HOURS PRN
Qty: 6 TABLET | Refills: 0 | Status: SHIPPED | OUTPATIENT
Start: 2021-05-19 | End: 2021-05-22

## 2021-05-19 RX ORDER — LIDOCAINE HYDROCHLORIDE 20 MG/ML
INJECTION, SOLUTION INFILTRATION; PERINEURAL PRN
Status: DISCONTINUED | OUTPATIENT
Start: 2021-05-19 | End: 2021-05-19

## 2021-05-19 RX ORDER — ONDANSETRON 2 MG/ML
4 INJECTION INTRAMUSCULAR; INTRAVENOUS EVERY 30 MIN PRN
Status: DISCONTINUED | OUTPATIENT
Start: 2021-05-19 | End: 2021-05-19 | Stop reason: HOSPADM

## 2021-05-19 RX ORDER — DEXAMETHASONE SODIUM PHOSPHATE 4 MG/ML
INJECTION, SOLUTION INTRA-ARTICULAR; INTRALESIONAL; INTRAMUSCULAR; INTRAVENOUS; SOFT TISSUE PRN
Status: DISCONTINUED | OUTPATIENT
Start: 2021-05-19 | End: 2021-05-19

## 2021-05-19 RX ORDER — OXYCODONE HYDROCHLORIDE 5 MG/1
5 TABLET ORAL
Status: COMPLETED | OUTPATIENT
Start: 2021-05-19 | End: 2021-05-19

## 2021-05-19 RX ORDER — NALOXONE HYDROCHLORIDE 0.4 MG/ML
0.4 INJECTION, SOLUTION INTRAMUSCULAR; INTRAVENOUS; SUBCUTANEOUS
Status: DISCONTINUED | OUTPATIENT
Start: 2021-05-19 | End: 2021-05-19 | Stop reason: HOSPADM

## 2021-05-19 RX ORDER — LIDOCAINE 40 MG/G
CREAM TOPICAL
Status: DISCONTINUED | OUTPATIENT
Start: 2021-05-19 | End: 2021-05-19 | Stop reason: HOSPADM

## 2021-05-19 RX ORDER — ACETAMINOPHEN 325 MG/1
975 TABLET ORAL ONCE
Status: DISCONTINUED | OUTPATIENT
Start: 2021-05-19 | End: 2021-05-19 | Stop reason: HOSPADM

## 2021-05-19 RX ORDER — CEFAZOLIN SODIUM 2 G/100ML
2 INJECTION, SOLUTION INTRAVENOUS
Status: COMPLETED | OUTPATIENT
Start: 2021-05-19 | End: 2021-05-19

## 2021-05-19 RX ORDER — FLUMAZENIL 0.1 MG/ML
0.2 INJECTION, SOLUTION INTRAVENOUS
Status: DISCONTINUED | OUTPATIENT
Start: 2021-05-19 | End: 2021-05-19 | Stop reason: HOSPADM

## 2021-05-19 RX ORDER — ACETAMINOPHEN 325 MG/1
975 TABLET ORAL ONCE
Status: COMPLETED | OUTPATIENT
Start: 2021-05-19 | End: 2021-05-19

## 2021-05-19 RX ORDER — BUPIVACAINE HYDROCHLORIDE 2.5 MG/ML
INJECTION, SOLUTION EPIDURAL; INFILTRATION; INTRACAUDAL PRN
Status: DISCONTINUED | OUTPATIENT
Start: 2021-05-19 | End: 2021-05-19

## 2021-05-19 RX ORDER — EPHEDRINE SULFATE 50 MG/ML
INJECTION, SOLUTION INTRAMUSCULAR; INTRAVENOUS; SUBCUTANEOUS PRN
Status: DISCONTINUED | OUTPATIENT
Start: 2021-05-19 | End: 2021-05-19

## 2021-05-19 RX ORDER — SODIUM CHLORIDE, SODIUM LACTATE, POTASSIUM CHLORIDE, CALCIUM CHLORIDE 600; 310; 30; 20 MG/100ML; MG/100ML; MG/100ML; MG/100ML
INJECTION, SOLUTION INTRAVENOUS CONTINUOUS
Status: DISCONTINUED | OUTPATIENT
Start: 2021-05-19 | End: 2021-05-19 | Stop reason: HOSPADM

## 2021-05-19 RX ORDER — HEPARIN SODIUM 5000 [USP'U]/.5ML
5000 INJECTION, SOLUTION INTRAVENOUS; SUBCUTANEOUS
Status: COMPLETED | OUTPATIENT
Start: 2021-05-19 | End: 2021-05-19

## 2021-05-19 RX ORDER — SODIUM CHLORIDE, SODIUM LACTATE, POTASSIUM CHLORIDE, CALCIUM CHLORIDE 600; 310; 30; 20 MG/100ML; MG/100ML; MG/100ML; MG/100ML
INJECTION, SOLUTION INTRAVENOUS CONTINUOUS PRN
Status: DISCONTINUED | OUTPATIENT
Start: 2021-05-19 | End: 2021-05-19

## 2021-05-19 RX ORDER — AMOXICILLIN 250 MG
1-2 CAPSULE ORAL 2 TIMES DAILY PRN
Qty: 30 TABLET | Refills: 0 | Status: SHIPPED | OUTPATIENT
Start: 2021-05-19

## 2021-05-19 RX ORDER — NALOXONE HYDROCHLORIDE 0.4 MG/ML
0.2 INJECTION, SOLUTION INTRAMUSCULAR; INTRAVENOUS; SUBCUTANEOUS
Status: DISCONTINUED | OUTPATIENT
Start: 2021-05-19 | End: 2021-05-19 | Stop reason: HOSPADM

## 2021-05-19 RX ORDER — ACETAMINOPHEN 325 MG/1
650 TABLET ORAL EVERY 6 HOURS PRN
Qty: 15 TABLET | Refills: 0 | Status: SHIPPED | OUTPATIENT
Start: 2021-05-19

## 2021-05-19 RX ORDER — MEPERIDINE HYDROCHLORIDE 25 MG/ML
12.5 INJECTION INTRAMUSCULAR; INTRAVENOUS; SUBCUTANEOUS
Status: DISCONTINUED | OUTPATIENT
Start: 2021-05-19 | End: 2021-05-19 | Stop reason: HOSPADM

## 2021-05-19 RX ORDER — IBUPROFEN 200 MG
800 TABLET ORAL ONCE
Status: DISCONTINUED | OUTPATIENT
Start: 2021-05-19 | End: 2021-05-19 | Stop reason: HOSPADM

## 2021-05-19 RX ORDER — OXYCODONE HYDROCHLORIDE 5 MG/1
5 TABLET ORAL EVERY 4 HOURS PRN
Status: DISCONTINUED | OUTPATIENT
Start: 2021-05-19 | End: 2021-05-19 | Stop reason: HOSPADM

## 2021-05-19 RX ORDER — FENTANYL CITRATE 50 UG/ML
INJECTION, SOLUTION INTRAMUSCULAR; INTRAVENOUS PRN
Status: DISCONTINUED | OUTPATIENT
Start: 2021-05-19 | End: 2021-05-19

## 2021-05-19 RX ADMIN — Medication 5 MG: at 08:48

## 2021-05-19 RX ADMIN — FENTANYL CITRATE 50 MCG: 50 INJECTION, SOLUTION INTRAMUSCULAR; INTRAVENOUS at 08:59

## 2021-05-19 RX ADMIN — ROCURONIUM BROMIDE 70 MG: 10 INJECTION INTRAVENOUS at 07:35

## 2021-05-19 RX ADMIN — HYDROMORPHONE HYDROCHLORIDE 0.3 MG: 1 INJECTION, SOLUTION INTRAMUSCULAR; INTRAVENOUS; SUBCUTANEOUS at 10:35

## 2021-05-19 RX ADMIN — HEPARIN SODIUM 5000 UNITS: 5000 INJECTION, SOLUTION INTRAVENOUS; SUBCUTANEOUS at 07:24

## 2021-05-19 RX ADMIN — DEXAMETHASONE SODIUM PHOSPHATE 2 MG: 4 INJECTION, SOLUTION INTRA-ARTICULAR; INTRALESIONAL; INTRAMUSCULAR; INTRAVENOUS; SOFT TISSUE at 07:25

## 2021-05-19 RX ADMIN — Medication 2 G: at 08:09

## 2021-05-19 RX ADMIN — PHENAZOPYRIDINE 200 MG: 200 TABLET ORAL at 05:56

## 2021-05-19 RX ADMIN — Medication 10 MG: at 07:41

## 2021-05-19 RX ADMIN — BUPIVACAINE HYDROCHLORIDE 50 ML: 2.5 INJECTION, SOLUTION EPIDURAL; INFILTRATION; INTRACAUDAL; PERINEURAL at 07:25

## 2021-05-19 RX ADMIN — ONDANSETRON 4 MG: 2 INJECTION INTRAMUSCULAR; INTRAVENOUS at 10:35

## 2021-05-19 RX ADMIN — FENTANYL CITRATE 150 MCG: 50 INJECTION, SOLUTION INTRAMUSCULAR; INTRAVENOUS at 07:35

## 2021-05-19 RX ADMIN — ACETAMINOPHEN 975 MG: 325 TABLET, FILM COATED ORAL at 05:55

## 2021-05-19 RX ADMIN — MIDAZOLAM 1 MG: 1 INJECTION INTRAMUSCULAR; INTRAVENOUS at 07:15

## 2021-05-19 RX ADMIN — SUGAMMADEX 100 MG: 100 INJECTION, SOLUTION INTRAVENOUS at 09:52

## 2021-05-19 RX ADMIN — ONDANSETRON 4 MG: 2 INJECTION INTRAMUSCULAR; INTRAVENOUS at 09:48

## 2021-05-19 RX ADMIN — SUGAMMADEX 100 MG: 100 INJECTION, SOLUTION INTRAVENOUS at 09:57

## 2021-05-19 RX ADMIN — Medication 5 MG: at 09:39

## 2021-05-19 RX ADMIN — PROPOFOL 150 MG: 10 INJECTION, EMULSION INTRAVENOUS at 07:35

## 2021-05-19 RX ADMIN — OXYCODONE HYDROCHLORIDE 5 MG: 5 TABLET ORAL at 10:36

## 2021-05-19 RX ADMIN — SODIUM CHLORIDE, POTASSIUM CHLORIDE, SODIUM LACTATE AND CALCIUM CHLORIDE: 600; 310; 30; 20 INJECTION, SOLUTION INTRAVENOUS at 07:26

## 2021-05-19 RX ADMIN — LIDOCAINE HYDROCHLORIDE 100 MG: 20 INJECTION, SOLUTION INFILTRATION; PERINEURAL at 07:35

## 2021-05-19 RX ADMIN — FENTANYL CITRATE 50 MCG: 50 INJECTION, SOLUTION INTRAMUSCULAR; INTRAVENOUS at 07:15

## 2021-05-19 RX ADMIN — ROCURONIUM BROMIDE 10 MG: 10 INJECTION INTRAVENOUS at 08:47

## 2021-05-19 RX ADMIN — Medication 5 MG: at 09:51

## 2021-05-19 RX ADMIN — DEXMEDETOMIDINE HYDROCHLORIDE 40 MCG: 100 INJECTION, SOLUTION INTRAVENOUS at 07:25

## 2021-05-19 ASSESSMENT — MIFFLIN-ST. JEOR: SCORE: 1071

## 2021-05-19 NOTE — ANESTHESIA POSTPROCEDURE EVALUATION
Patient: Yamini Jackson    Procedure(s):  Pelvic Exam Under Anesthesia, Robotic-Assisted Removal of Left Fallopian Tube and Ovary, Right FallopianTube    Diagnosis:Pelvic mass [R19.00]  Diagnosis Additional Information: No value filed.    Anesthesia Type:  General    Note:  Disposition: Outpatient   Postop Pain Control: Uneventful            Sign Out: Well controlled pain   PONV: No   Neuro/Psych: Uneventful            Sign Out: Acceptable/Baseline neuro status   Airway/Respiratory: Uneventful            Sign Out: Acceptable/Baseline resp. status   CV/Hemodynamics: Uneventful            Sign Out: Acceptable CV status   Other NRE: NONE   DID A NON-ROUTINE EVENT OCCUR? No           Last vitals:  Vitals:    05/19/21 1100 05/19/21 1145 05/19/21 1215   BP: (!) 138/90 127/86 (!) 143/100   Pulse: 58 72 68   Resp: 12 14 16   Temp: 36.3  C (97.3  F) 36.4  C (97.5  F)    SpO2: 100% 99% 100%       Last vitals prior to Anesthesia Care Transfer:  CRNA VITALS  5/19/2021 0935 - 5/19/2021 1035      5/19/2021             NIBP:  133/74    Ht Rate:  57          Electronically Signed By: Christopher J. Behrens, MD  May 19, 2021  12:21 PM

## 2021-05-19 NOTE — PROGRESS NOTES
Gynecologic Oncology Postoperative Check Note  5/19/2021    S: Feeling ok. Abdominal pain is well controlled with binder in place. Ambulated with assistance to restroom. Voided. Feeling a little queasy but tolerating apple juice well, which is helping. Denies chest pain, shortness of breath.    O:  Vitals:    05/19/21 0600 05/19/21 0710 05/19/21 0715 05/19/21 0720   BP: (!) 141/91 (!) 142/87 (!) 147/85 135/80   Pulse: 66 70 70 60   Resp: 16 18 11 14   Temp: 97.8  F (36.6  C)      TempSrc: Oral      SpO2: 100% 100% 100% 100%   Weight:       Height:             Gen: alert and oriented, resting in bed  Cardio: rrr  Resp: lungs CTAB, no wheezes or crackles  Abdomen: soft, appropriately tender to palpation  Incision: dressing in place over 5 laparoscopic incisions, minimal shadowing  Extremities: BLEs non-tender    No intake/output data recorded.    A: 65 year old POD#0 s/p RA-LSO, RS. Doing well in the immediate postoperative period.    Dz: left ovarian cyst, benign on frozen  FEN: ADAT.  Pain: s/p TAP block, continue tylenol, Oxycodone prn  Heme: Hgb 13.4>EBL 10. VSS, no concern for ongoing bleeding  CV: HTN, continue metoprolol  Pulm: NI, Encourage IS use.   GI:  ADAT, prn antiemetics and bowel regimen   : s/p babcock, voiding  ID: s/p Ancef, Afebrile,    Endo: NI  Psych/Neuro: NI  PPX: SCDs  Dispo: Discharge to home     Shaista Adams MD,  Ob/Gyn PGY-4  05/19/21 12:26 PM

## 2021-05-19 NOTE — OP NOTE
Gynecologic Oncology Operative Report    Yamini Jackson  [id number]    PREOPERATIVE DIAGNOSIS:   - pelvic mass    POSTOPERATIVE DIAGNOSIS:   - pelvic mass    PROCEDURES: Robotic-assisted  Left salpingo-oophorectomy, right salpingectomy    SURGEON: Saranya Higgins MD   ASSISTANTS: Giulia Villanueva MD Gyn Onc Fellow    ANESTHESIA: General endotracheal.   ESTIMATED BLOOD LOSS: 10 ml   IV FLUIDS: 800 ml LR  URINE OUTPUT: 500 ml pyridium-stained urine  SPECIMENS: left salpingo-oophorectomy, right salpingectomy  COMPLICATIONS: None.      INDICATIONS: Yamini Jackson is a 65 year old with a complex ovarian mass. She was recommended to undergo robotic assisted hysterectomy and bilateral salpingo-oophorectomy, but after discussion, preferred to have left salpingo-oophorectomy and right salpingectomy with further procedures only if indicated by frozen section. Risks, benefits, and alternatives to the procedure were discussed with the patient who elected to proceed.  Risks discussed include bleeding, infection, injury to surrounding structure, blood clots. All questions were answered and an informed consent was obtained.    FINDINGS: On exam under anesthesia, normal external genitalia, cystocele, cervix and vagina otherwise palpated normal. Uterus small, mobile. Bilateral adnexal fullness noted. On laparoscopy, atraumatic entry site. Normal gallbladder. Liver with several 1-2 mm white nodules. ~6 cm left ovarian cyst with clear fluid inside, benign on frozen pathology. Normal appearing right ovary, bilateral tubes, and uterus. Pelvis with several 1-2 mm clear implants consistent with prior endometriosis implants.     OPERATIVE PROCEDURE IN DETAIL: Consent was reviewed with the patient in the preoperative setting and confirmed. She received prophylactic antibiotics. In addition, she received heparin for venous thrombosis prevention. The patient was transferred to the operating room and placed in dorsal supine position.  General anesthetic was obtained in the usual manner without noted difficulties. The patient was then positioned onto Kyle stirrups and an exam under anesthesia was performed with findings as described above. Kraus catheter was then placed under sterile techniques and the patient was prepped and draped for the above-mentioned procedure.     Timeout was called at which point the patient's name, procedure and operative site was confirmed by the operative team. A stab incision was made at the level of the umbilicus. The umbilicus was then elevated and the Veress needle introduced through this stab incision. The abdomen was insufflated with an opening pressure of 2 mmHg. The Veress needle was removed. Sites for the da Callie laparoscopic ports were demarcated, total of 5, initiating midline approximately 2 cm above the umbilicus and positioned in a semicircular fashion around the upper abdomen. The initial midline 8 mm port was inserted without difficulties, the left 2 lateral 8 mm da Callie ports were inserted and on the right a 8 mm assistant port and an 8 mm da Callie port all under direct visualization without any vascular injury noted. At this point, the patient was placed into steep Trendelenburg. The pelvis was inspected as well as the upper abdomen as noted above. Pelvic washings were obtained. Bowels were packed up into the upper abdomen with gentle traction. At this point, da Callie was docked onto the ports, all appropriate instruments were inserted.     The left round ligament was cauterized and transected, and the posterior leaf of the broad ligament was divided to open the retroperitonuem. As we were able to visualize the course of the ureter deep in the pelvis, we performed a window in the medial leaf of broad ligament well above that site and isolated out the right ovarian vessels. The vessels were cauterized with the bipolar and transected. The mesoovarium was divided, and the uteroovarian vessels and tube  were cauterized at the cornua and transected. The tube and ovary were placed in a 5 mm Endocatch bag, removed, and sent for frozen section.     Attention was turned to the right. The tube was grasped and elevated. The mesosalpinx was serially cauterized and transected, and tube was amputated at the cornua, removed and sent to pathology. The pelvis was irrigated, and all surgical pedicles were noted to be hemostatic.     When the frozen pathology was benign, the the robot was undocked, and pneumoperitoneum expelled. The ports were removed, and the skin incisions were closed with 4-0 Monocryl and covered with sterile dressing.    Sponge, lap and needle counts were reported as correct x2 and instrument count was correct x1.     Dr Higgins was present and scrubbed throughout the entire procedure.    Shaista Adams MD,  Ob/Gyn PGY-4  05/19/21 11:27 AM    Saranya Higgins MD    Department of Ob/Gyn and Women's Health  Division of Gynecologic Oncology  [facility]  [phone]    I was scrubbed and present for the entire procedure.

## 2021-05-19 NOTE — PROGRESS NOTES
Discussed with patient and her  this morning plan for removal of only left ovary and FT and R FT. We will send for frozen and if benign will not proceed with hysterectomy and removal of the right ovary. Patient aware that she could develop a cyst on the right ovary in the future which could require further interventions. She is understanding of the above and we will proceed as above. If cancer or LMP will perform TLH/RSO and staging.    Saranya Higgins MD    Department of Ob/Gyn and Women's Health  Division of Gynecologic Oncology  [facility]  [phone]

## 2021-05-19 NOTE — DISCHARGE INSTRUCTIONS
[facility]  Same-Day Surgery   Adult Discharge Orders & Instructions   For 24 hours after surgery  1. Get plenty of rest.  A responsible adult must stay with you for at least 24 hours after you leave the hospital.   2. Do not drive or use heavy equipment.  If you have weakness or tingling, don't drive or use heavy equipment until this feeling goes away.  3. Do not drink alcohol.  4. Avoid strenuous or risky activities.  Ask for help when climbing stairs.   5. You may feel lightheaded.  IF so, sit for a few minutes before standing.  Have someone help you get up.   6. If you have nausea (feel sick to your stomach): Drink only clear liquids such as apple juice, ginger ale, broth or 7-Up.  Rest may also help.  Be sure to drink enough fluids.  Move to a regular diet as you feel able.  7. You may have a slight fever. Call the doctor if your fever is over 100 F (37.7 C) (taken under the tongue) or lasts longer than 24 hours.  8. You may have a dry mouth, a sore throat, muscle aches or trouble sleeping.  These should go away after 24 hours.  9. Do not make important or legal decisions.   Call your doctor for any of the followin.  Signs of infection (fever, growing tenderness at the surgery site, a large amount of drainage or bleeding, severe pain, foul-smelling drainage, redness, swelling).    2. It has been over 8 to 10 hours since surgery and you are still not able to urinate (pass water).    3.  Headache for over 24 hours.    To contact a doctor, call Dr Higgins at the Gynecology-Oncology clinic at [phone] or:      [phone] and ask for the resident on call for GYN/ONC (answered 24 hours a day)      Emergency Department: [facility]: [phone]

## 2021-05-19 NOTE — BRIEF OP NOTE
[facility]    Brief Operative Note    Pre-operative diagnosis: Pelvic mass [R19.00]  Post-operative diagnosis Same as pre-operative diagnosis    Procedure: Procedure(s):  Pelvic Exam Under Anesthesia, Robotic-Assisted Removal of Left Fallopian Tube and Ovary, Right FallopianTube  Surgeon: Surgeon(s) and Role:     * Saranya Higgins MD - Primary     * Shaista Adams MD - Resident - Assisting   Giulia Villanueva MD - Gyn Onc Fellow  Fernanda Hairston, MS4  Anesthesia: Combined General with Block   Estimated blood loss: 10 ml  IVF: 800 ml LR  UOP: 500 ml pyridium-stained urine  Drains: None  Specimens:   ID Type Source Tests Collected by Time Destination   1 : Pelvic Washing Fluid Other CYTOLOGY NON GYN Saranya Higgins MD 5/19/2021  8:39 AM    A :  Organ Fallopian Tube and Ovary, Left SURGICAL PATHOLOGY EXAM Saranya Higgins MD 5/19/2021  9:09 AM    B : rigth fallopian tube Tissue Fallopian Tube, Right SURGICAL PATHOLOGY EXAM Saranya Higgins MD 5/19/2021  9:25 AM      Findings:   On exam under anesthesia, normal external genitalia, cystocele, cervix and vagina otherwise palpated normal. Uterus small, mobile. Bilateral adnexal fullness noted. On laparoscopy, atraumatic entry site. Normal gallbladder. Liver with several 1-2 mm white nodules. ~6 cm left ovarian cyst with clear fluid inside, benign on frozen pathology. Normal appearing right ovary, bilateral tubes, and uterus. Pelvis with several 1-2 mm clear implants consistent with prior endometriosis implants.    Complications: None.  Implants: * No implants in log *     Shaista Adams MD,  Ob/Gyn PGY-4  05/19/21 9:55 AM      Saranya Higgins MD    Department of Ob/Gyn and Women's Health  Division of Gynecologic Oncology  [facility]  [phone]    I was scrubbed and present for the entire procedure.

## 2021-05-19 NOTE — ANESTHESIA PROCEDURE NOTES
TAP Procedure Note  Pre-Procedure   Staff -        Anesthesiologist:  Gordon Bowman MD       Resident/Fellow: Ngoc Mccollum MD       Performed By: resident       Location: pre-op       Pre-Anesthestic Checklist: patient identified, IV checked, site marked, risks and benefits discussed, informed consent, monitors and equipment checked, pre-op evaluation, at physician/surgeon's request and post-op pain management  Timeout:       Correct Patient: Yes        Correct Procedure: Yes        Correct Site: Yes        Correct Position: Yes        Correct Laterality: Yes        Site Marked: Yes  Procedure Documentation  Procedure: TAP       Diagnosis: POST OPERATIVE PAIN       Laterality: bilateral       Patient Position: supine       Patient Prep/Sterile Barriers: sterile gloves, mask       Skin prep: Chloraprep       Needle Type: short bevel       Needle Gauge: 21.        Needle Length (millimeters): 110        Ultrasound guided       1. Ultrasound was used to identify targeted nerve, plexus, vascular marker, or fascial plane and place a needle adjacent to it in real-time.       2. Ultrasound was used to visualize the spread of anesthetic in close proximity to the above referenced structure.       3. A permanent image is entered into the patient's record.    Assessment/Narrative         The placement was negative for: blood aspirated, painful injection and site bleeding       Paresthesias: No.     Bolus given via needle..        Secured via.        Insertion/Infusion Method: Single Shot       Complications: none       Injection made incrementally with aspirations every 5 mL.

## 2021-05-19 NOTE — ANESTHESIA CARE TRANSFER NOTE
Patient: Yamini Jackson    Procedure(s):  Pelvic Exam Under Anesthesia, Robotic-Assisted Removal of Left Fallopian Tube and Ovary, Right FallopianTube    Diagnosis: Pelvic mass [R19.00]  Diagnosis Additional Information: No value filed.    Anesthesia Type:   General     Note:    Oropharynx: oral airway in place  Level of Consciousness: drowsy  Oxygen Supplementation: nasal cannula  Level of Supplemental Oxygen (L/min / FiO2): 4  Independent Airway: airway patency satisfactory and stable  Dentition: dentition unchanged  Vital Signs Stable: post-procedure vital signs reviewed and stable  Report to RN Given: handoff report given  Patient transferred to: PACU    Handoff Report: Identifed the Patient, Identified the Reponsible Provider, Reviewed the pertinent medical history, Discussed the surgical course, Reviewed Intra-OP anesthesia mangement and issues during anesthesia, Set expectations for post-procedure period and Allowed opportunity for questions and acknowledgement of understanding      Vitals: (Last set prior to Anesthesia Care Transfer)  CRNA VITALS  5/19/2021 0935 - 5/19/2021 1012      5/19/2021             NIBP:  133/74    Ht Rate:  57        Electronically Signed By: MARYAM Loera CRNA  May 19, 2021  10:12 AM

## 2021-05-19 NOTE — ANESTHESIA PREPROCEDURE EVALUATION
"Anesthesia Pre-Procedure Evaluation    Patient: Yamini Jackson   MRN: [id number] : 1955        Preoperative Diagnosis: Pelvic mass [R19.00]   Procedure : Procedure(s):  HYSTERECTOMY, TOTAL, ROBOT-ASSISTED, WITH BILATERAL SALPINGO-OOPHORECTOMY, AND BILATERAL PELVIC LYMPHADENECTOMY  HYSTERECTOMY, TOTAL ABDOMINAL, WITH BILATERAL SALPINGO-OOPHORECTOMY, WITH LYMPHADENECTOMY  CYSTOSCOPY     Ms. Jackson was seen in the Gynecologic Cancer Clinic at the [facility] on 2021 with Dr. Higgins for evaluation of recent diagnosis of complex left ovarian cyst.  Pelvic ultrasound imaging on 2021 showed progressive enlargement and increasing complex appearance of the left ovarian cyst measuring 7.0 x 2.7 x 4.7 cm.  Her  on 2021 was 17.  Dr. Higgins counseled Ms. Jackson on treatment.  Ms. Jackson has opted to proceed with the above surgical intervention.    Past Medical History:   Diagnosis Date     Diverticulitis 2020     Gastroesophageal reflux disease      Hypertension      Pelvic mass       Past Surgical History:   Procedure Laterality Date     ADENOIDECTOMY       AS RAD RESEC TONSIL/PILLARS       cervical polypectomy      X2     CERVICAL POLYPECTOMY      X2     COLONOSCOPY       dental extractions       LUMPECTOMY BREAST        Allergies   Allergen Reactions     Gluten Meal Other (See Comments)     \"doest't eat, gets achy from it\"     Ibuprofen Hives and Itching     PN: LW Reaction: HIVES        Social History     Tobacco Use     Smoking status: Never Smoker     Smokeless tobacco: Never Used   Substance Use Topics     Alcohol use: Not Currently      Wt Readings from Last 1 Encounters:   21 53.1 kg (117 lb 1 oz)        Anesthesia Evaluation            ROS/MED HX  ENT/Pulmonary:       Neurologic:       Cardiovascular:     (+) hypertension----- (-) murmur and wheezes   METS/Exercise Tolerance:     Hematologic:       Musculoskeletal:       GI/Hepatic:     (+) GERD,   "   Renal/Genitourinary:       Endo:       Psychiatric/Substance Use:       Infectious Disease:       Malignancy:       Other:            Physical Exam    Airway        Mallampati: II   TM distance: > 3 FB   Neck ROM: full   Mouth opening: > 3 cm    Respiratory Devices and Support         Dental  no notable dental history         Cardiovascular          Rhythm and rate: regular and normal (-) no systolic click and no murmur    Pulmonary   pulmonary exam normal        breath sounds clear to auscultation   (-) no wheezes        OUTSIDE LABS:  CBC:   Lab Results   Component Value Date    WBC 6.3 05/17/2021    HGB 13.4 05/17/2021    HCT 38.9 05/17/2021     05/17/2021     BMP: No results found for: NA, POTASSIUM, CHLORIDE, CO2, BUN, CR, GLC  COAGS: No results found for: PTT, INR, FIBR  POC:   Lab Results   Component Value Date    BGM 88 05/19/2021     HEPATIC: No results found for: ALBUMIN, PROTTOTAL, ALT, AST, GGT, ALKPHOS, BILITOTAL, BILIDIRECT, EDWARD  OTHER: No results found for: PH, LACT, A1C, SONDRA, PHOS, MAG, LIPASE, AMYLASE, TSH, T4, T3, CRP, SED    Anesthesia Plan    ASA Status:  3   NPO Status:  NPO Appropriate    Anesthesia Type: General.     - Airway: ETT   Induction: Intravenous.   Maintenance: Inhalation.        Consents    Anesthesia Plan(s) and associated risks, benefits, and realistic alternatives discussed. Questions answered and patient/representative(s) expressed understanding.     - Discussed with:  Patient      - Extended Intubation/Ventilatory Support Discussed: Yes.      - Patient is DNR/DNI Status: No    Use of blood products discussed: Yes.     - Discussed with: Patient.     Postoperative Care    Pain management: IV analgesics.   PONV prophylaxis: Ondansetron (or other 5HT-3), Dexamethasone or Solumedrol     Comments:                Christopher J. Behrens, MD

## 2021-05-19 NOTE — OR NURSING
GYN/ONC at bedside in phase II. Patient cleared for discharge to home. Prescriptions picked up at pharmacy.

## 2021-05-21 LAB — COPATH REPORT: NORMAL

## 2021-05-26 LAB — COPATH REPORT: NORMAL

## 2021-06-07 ENCOUNTER — ONCOLOGY VISIT (OUTPATIENT)
Dept: ONCOLOGY | Facility: CLINIC | Age: 66
End: 2021-06-07
Attending: OBSTETRICS & GYNECOLOGY
Payer: COMMERCIAL

## 2021-06-07 VITALS
DIASTOLIC BLOOD PRESSURE: 67 MMHG | BODY MASS INDEX: 20.84 KG/M2 | WEIGHT: 117.6 LBS | HEIGHT: 63 IN | SYSTOLIC BLOOD PRESSURE: 103 MMHG | RESPIRATION RATE: 16 BRPM | HEART RATE: 69 BPM | TEMPERATURE: 98.8 F | OXYGEN SATURATION: 100 %

## 2021-06-07 DIAGNOSIS — D27.1 SEROUS CYSTADENOMA OF LEFT OVARY: Primary | ICD-10-CM

## 2021-06-07 PROCEDURE — 999N001193 HC VIDEO/TELEPHONE VISIT; NO CHARGE

## 2021-06-07 PROCEDURE — 99213 OFFICE O/P EST LOW 20 MIN: CPT | Performed by: OBSTETRICS & GYNECOLOGY

## 2021-06-07 ASSESSMENT — PAIN SCALES - GENERAL: PAINLEVEL: NO PAIN (0)

## 2021-06-07 ASSESSMENT — MIFFLIN-ST. JEOR: SCORE: 1056.81

## 2021-06-07 NOTE — PROGRESS NOTES
Consult Notes on Referred Patient    Date: 2021         Dr. Sanchez referring provider defined for this encounter.       RE: Yamini Jackson  : 1955  DAVID: 2021      Dear Dr. Del Valle,    I had the pleasure of seeing your patient Yamini Jackson here at the Gynecologic Cancer Clinic at the [facility] on 2021.  As you know she is a very pleasant 65 year old woman with a recent diagnosis of complex left ovarian cyst now s/p surgical excision.    Today: feeling pretty well, no nausea or vomiting, urinating well. Bowels working. Steri strips are still on.     2020 CT Ab/Pelv:  Pelvis: A 4 cm simple appearing cyst is in the left adnexa on series 2, image 117. Remainder of the pelvis is unremarkable.     2021 Pelvic US ([facility])  COMPARISON:  Pelvic ultrasound dated 2017 and CT of the pelvis dated 2020  FINDINGS:    Sonographic images demonstrate a normal size and smooth outer contour of the uterus. Uterus measures 5.4 cm in length by 3.1 cm in AP diameter by 4 point cm in transverse dimension.  The myometrium has a normal uniform echotexture. The endometrial lining appears normal and measures 2.0 mm in composite thickness.         The right ovary appears normal and measures 2.4 x 1.3 x 2.1 cm. There is cystic enlargement of the left ovary which measures 7.0 x 2.7 x 4.7 cm. As compared to the prior sonogram dated 2017 and the prior CT dated 2000 the left ovarian cyst has increased in size. Currently it measures 4.7 x 2.8 x 4.3 cm and within one of the components there is layered debris. Previously there was a single thin internal septation and now there are several internal septations. There are no suspicious fluid collections within the cul-de-sac.         IMPRESSION:    Progressive enlargement and increasing complex appearance of the left ovarian cyst. Findings would raise a concern for malignancy and GYN  referral recommended.      2021:  17      Simons syndrome testing negative.    21: SPECIMEN(S):   A: Ovary and fallopian tube, left   B: Fallopian tube, right     FINAL DIAGNOSIS:   A. OVARY AND FALLOPIAN TUBE, LEFT, SALPINGO-OOPHORECTOMY:   - Simple serous cystadenoma   - Ovarian stroma identified in the wall of the cyst   - Fallopian tube with no significant histologic abnormality   - Negative for malignancy     B. FALLOPIAN TUBE, RIGHT, SALPINGECTOMY:   - Fallopian tube with no significant histologic abnormality     Today: Patient is a .       Review of Systems:    I have reviewed the pertinent positive findings in the review of symptoms with the patient.        Past Medical History:    No heart disease or lung dz  HTN   x 3    Past Surgical History:    Cervix polyp removed and endometrial polyp removed.  No abdominal surgery    Health Maintenance:  Health Maintenance Due   Topic Date Due     PREVENTIVE CARE VISIT  Never done     ADVANCE CARE PLANNING  Never done     MAMMO SCREENING  Never done     COLORECTAL CANCER SCREENING  Never done     HIV SCREENING  Never done     HEPATITIS C SCREENING  Never done     PAP  Never done     DTAP/TDAP/TD IMMUNIZATION (1 - Tdap) Never done     LIPID  Never done     ZOSTER IMMUNIZATION (1 of 2) Never done       Last Pap Smear:               Result: normal   Had one abnormal pap long ago-repeated and was normal.    Last Mammogram: 2019            Result: abnormal: followed up with US      She has had a history of abnormal mammograms.    Last Colonoscopy: 2020              Result: abnormal: diverticulosis done every 5 years.              Current Medications:     has a current medication list which includes the following prescription(s): acetaminophen, calcium citrate, colostrum, famotidine, magnesium, metoprolol succinate er, ondansetron, potassium chloride er, and senna-docusate.       Allergies:     Allergies   Allergen Reactions     Gluten  "Meal Other (See Comments)     \"doest't eat, gets achy from it\"     Ibuprofen Hives and Itching     PN: LW Reaction: HIVES              Social History:     Social History     Tobacco Use     Smoking status: Never Smoker     Smokeless tobacco: Never Used   Substance Use Topics     Alcohol use: Not Currently       History   Drug Use Unknown           Family History:     The patient's family history is notable for the following    No uterine cancer in family.  No breast, ovarian  Colon cancer-mother age 63 right sided colon ca, aunt early 70's (maternal aunt), another maternal aunt with \"cancer of the omentum\"  age 61.    Physical Exam:     /67 (BP Location: Right arm, Patient Position: Sitting, Cuff Size: Adult Regular)   Pulse 69   Temp 98.8  F (37.1  C) (Oral)   Resp 16   Ht 1.607 m (5' 3.27\")   Wt 53.3 kg (117 lb 9.6 oz)   SpO2 100%   BMI 20.66 kg/m    Body mass index is 20.66 kg/m .    General Appearance: healthy and alert, no distress       Wounds: CDI, steri strips on in place.    : deferred         Assessment:    Yamini Jackson is a 65 year old woman with a new diagnosis of left simple serous cystadenoma       Plan:     1.)     s/p removal of LFT and ovary and right FT. Benign disease. Patient aware if any bleeding in future this needs to be investigated.     2.) Genetic risk factors were assessed and is negative for Simons syndrome. Testing recently done.    3.) Labs and/or tests ordered include:  none     4.) Health maintenance issues addressed today include UTD       Thank you for allowing me to participate in the care of this patient.  If you have any questions or concerns, please do not hesitate to contact me.        Sincerely,  Saranya Higgins MD    Department of Ob/Gyn and Women's Health  Division of Gynecologic Oncology  [facility]  [phone]    Of a 20 minute appointment, more than 50% was spent in counseling the " patient.    CC  Patient Care Team:  Jennifer Carr MD as PCP - General  Saranya Higgins MD as MD (Gynecologic Oncology)  Anitra Del Valle MD as MD (OB/Gyn)  Saranya Higgins MD as Assigned Cancer Care Provider

## 2021-06-07 NOTE — LETTER
2021         RE: Yamini Jackson  [address] [facility] [place] [address]        Dear Colleague,    Thank you for referring your patient, Yamini Jackson, to the [facility]. Please see a copy of my visit note below.                              Consult Notes on Referred Patient    Date: 2021         Dr. Sanchez referring provider defined for this encounter.       RE: Yamini Jackson  : 1955  DAVID: 2021      Dear Dr. Del Valle,    I had the pleasure of seeing your patient Yamini Jackson here at the Gynecologic Cancer Clinic at the [facility] on 2021.  As you know she is a very pleasant 65 year old woman with a recent diagnosis of complex left ovarian cyst now s/p surgical excision.    Today: feeling pretty well, no nausea or vomiting, urinating well. Bowels working. Steri strips are still on.     2020 CT Ab/Pelv:  Pelvis: A 4 cm simple appearing cyst is in the left adnexa on series 2, image 117. Remainder of the pelvis is unremarkable.     2021 Pelvic US ([facility])  COMPARISON:  Pelvic ultrasound dated 2017 and CT of the pelvis dated 2020  FINDINGS:    Sonographic images demonstrate a normal size and smooth outer contour of the uterus. Uterus measures 5.4 cm in length by 3.1 cm in AP diameter by 4 point cm in transverse dimension.  The myometrium has a normal uniform echotexture. The endometrial lining appears normal and measures 2.0 mm in composite thickness.         The right ovary appears normal and measures 2.4 x 1.3 x 2.1 cm. There is cystic enlargement of the left ovary which measures 7.0 x 2.7 x 4.7 cm. As compared to the prior sonogram dated 2017 and the prior CT dated 2000 the left ovarian cyst has increased in size. Currently it measures 4.7 x 2.8 x 4.3 cm and within one of the components there is layered debris. Previously there was a single thin internal septation and now there are  several internal septations. There are no suspicious fluid collections within the cul-de-sac.         IMPRESSION:    Progressive enlargement and increasing complex appearance of the left ovarian cyst. Findings would raise a concern for malignancy and GYN referral recommended.      2021:  17      Simons syndrome testing negative.    21: SPECIMEN(S):   A: Ovary and fallopian tube, left   B: Fallopian tube, right     FINAL DIAGNOSIS:   A. OVARY AND FALLOPIAN TUBE, LEFT, SALPINGO-OOPHORECTOMY:   - Simple serous cystadenoma   - Ovarian stroma identified in the wall of the cyst   - Fallopian tube with no significant histologic abnormality   - Negative for malignancy     B. FALLOPIAN TUBE, RIGHT, SALPINGECTOMY:   - Fallopian tube with no significant histologic abnormality     Today: Patient is a .       Review of Systems:    I have reviewed the pertinent positive findings in the review of symptoms with the patient.        Past Medical History:    No heart disease or lung dz  HTN   x 3    Past Surgical History:    Cervix polyp removed and endometrial polyp removed.  No abdominal surgery    Health Maintenance:  Health Maintenance Due   Topic Date Due     PREVENTIVE CARE VISIT  Never done     ADVANCE CARE PLANNING  Never done     MAMMO SCREENING  Never done     COLORECTAL CANCER SCREENING  Never done     HIV SCREENING  Never done     HEPATITIS C SCREENING  Never done     PAP  Never done     DTAP/TDAP/TD IMMUNIZATION (1 - Tdap) Never done     LIPID  Never done     ZOSTER IMMUNIZATION (1 of 2) Never done       Last Pap Smear:               Result: normal   Had one abnormal pap long ago-repeated and was normal.    Last Mammogram: 2019            Result: abnormal: followed up with US      She has had a history of abnormal mammograms.    Last Colonoscopy: 2020              Result: abnormal: diverticulosis done every 5 years.              Current Medications:     has a current medication list  "which includes the following prescription(s): acetaminophen, calcium citrate, colostrum, famotidine, magnesium, metoprolol succinate er, ondansetron, potassium chloride er, and senna-docusate.       Allergies:     Allergies   Allergen Reactions     Gluten Meal Other (See Comments)     \"doest't eat, gets achy from it\"     Ibuprofen Hives and Itching     PN: LW Reaction: HIVES              Social History:     Social History     Tobacco Use     Smoking status: Never Smoker     Smokeless tobacco: Never Used   Substance Use Topics     Alcohol use: Not Currently       History   Drug Use Unknown           Family History:     The patient's family history is notable for the following    No uterine cancer in family.  No breast, ovarian  Colon cancer-mother age 63 right sided colon ca, aunt early 70's (maternal aunt), another maternal aunt with \"cancer of the omentum\"  age 61.    Physical Exam:     /67 (BP Location: Right arm, Patient Position: Sitting, Cuff Size: Adult Regular)   Pulse 69   Temp 98.8  F (37.1  C) (Oral)   Resp 16   Ht 1.607 m (5' 3.27\")   Wt 53.3 kg (117 lb 9.6 oz)   SpO2 100%   BMI 20.66 kg/m    Body mass index is 20.66 kg/m .    General Appearance: healthy and alert, no distress       Wounds: CDI, steri strips on in place.    : deferred         Assessment:    Yamini Jackson is a 65 year old woman with a new diagnosis of left simple serous cystadenoma       Plan:     1.)     s/p removal of LFT and ovary and right FT. Benign disease. Patient aware if any bleeding in future this needs to be investigated.     2.) Genetic risk factors were assessed and is negative for Simons syndrome. Testing recently done.    3.) Labs and/or tests ordered include:  none     4.) Health maintenance issues addressed today include UTD       Thank you for allowing me to participate in the care of this patient.  If you have any questions or concerns, please do not hesitate to contact me.    Sincerely,  Saranya" MD Gisela    Department of Ob/Gyn and Women's Health  Division of Gynecologic Oncology  [facility]  [phone]    Of a 20 minute appointment, more than 50% was spent in counseling the patient.    CC  Patient Care Team:  Jennifer Carr MD as PCP - General  Anitra Del Valle MD as MD (OB/Gyn)

## 2021-06-07 NOTE — NURSING NOTE
"Oncology Rooming Note    June 7, 2021 3:19 PM   Yamini Jackson is a 66 year old female who presents for:    Chief Complaint   Patient presents with     Oncology Clinic Visit     SEROUS CYSTADENOMA OF LEFT OVARY     Initial Vitals: /67 (BP Location: Right arm, Patient Position: Sitting, Cuff Size: Adult Regular)   Pulse 69   Temp 98.8  F (37.1  C) (Oral)   Resp 16   Ht 1.607 m (5' 3.27\")   Wt 53.3 kg (117 lb 9.6 oz)   SpO2 100%   BMI 20.66 kg/m   Estimated body mass index is 20.66 kg/m  as calculated from the following:    Height as of this encounter: 1.607 m (5' 3.27\").    Weight as of this encounter: 53.3 kg (117 lb 9.6 oz). Body surface area is 1.54 meters squared.  No Pain (0) Comment: Data Unavailable   No LMP recorded. Patient is postmenopausal.  Allergies reviewed: Yes  Medications reviewed: Yes    Medications: Medication refills not needed today.  Pharmacy name entered into [employer]:    [id number] [address] AT [address] [place] [address] [facility] #[id number] - [place], [place] - [address] AT [address]    Clinical concerns: No new concerns.        Magi Garner CMA              "

## 2021-06-08 NOTE — PATIENT INSTRUCTIONS
No further follow up needed in gyn oncology  Continue regular screening vists with PCP and gynecology

## 2021-06-22 ENCOUNTER — DOCUMENTATION ONLY (OUTPATIENT)
Dept: OTHER | Facility: CLINIC | Age: 66
End: 2021-06-22

## 2021-10-17 ENCOUNTER — HEALTH MAINTENANCE LETTER (OUTPATIENT)
Age: 66
End: 2021-10-17

## 2021-11-29 ENCOUNTER — TELEPHONE (OUTPATIENT)
Dept: ONCOLOGY | Facility: CLINIC | Age: 66
End: 2021-11-29
Payer: COMMERCIAL

## 2021-11-29 NOTE — TELEPHONE ENCOUNTER
Yamini contacted me by phone and left a message on my office number regarding a bill she received for genetic testing.  As Yamini was not available when I called back, I left a message with my contact information and let her know that I will look into the status of her bill through [employer].  I will contact Yamini again once I receive an update from the laboratory.

## 2021-12-09 ENCOUNTER — MYC MEDICAL ADVICE (OUTPATIENT)
Dept: ONCOLOGY | Facility: CLINIC | Age: 66
End: 2021-12-09
Payer: COMMERCIAL

## 2022-05-29 ENCOUNTER — HEALTH MAINTENANCE LETTER (OUTPATIENT)
Age: 67
End: 2022-05-29

## 2022-07-24 ENCOUNTER — HEALTH MAINTENANCE LETTER (OUTPATIENT)
Age: 67
End: 2022-07-24

## 2022-10-03 ENCOUNTER — HEALTH MAINTENANCE LETTER (OUTPATIENT)
Age: 67
End: 2022-10-03

## 2023-05-20 ENCOUNTER — HEALTH MAINTENANCE LETTER (OUTPATIENT)
Age: 68
End: 2023-05-20

## 2023-08-12 ENCOUNTER — HEALTH MAINTENANCE LETTER (OUTPATIENT)
Age: 68
End: 2023-08-12

## (undated) DEVICE — PACK GOWN 3/PK DISP XL SBA32GPFCB

## (undated) DEVICE — WIPES FOLEY CARE SURESTEP PROVON DFC100

## (undated) DEVICE — DRAPE SHEET REV FOLD 3/4 9349

## (undated) DEVICE — TUBING CONMED AIRSEAL SMOKE EVAC INSUFFLATION ASM-EVAC

## (undated) DEVICE — DAVINCI XI GRASPER PROGRASP 8MM EXT 471093

## (undated) DEVICE — PROTECTOR ARM ONE-STEP TRENDELENBURG 40418

## (undated) DEVICE — ESU GROUND PAD ADULT W/CORD E7507

## (undated) DEVICE — LINEN TOWEL PACK X30 5481

## (undated) DEVICE — ENDO POUCH UNIVERSAL RETRIEVAL SYSTEM INZII 5MM CD003

## (undated) DEVICE — Device

## (undated) DEVICE — DAVINCI XI SEAL UNIVERSAL 5-8MM 470361

## (undated) DEVICE — SYSTEM CLEARIFY VISUALIZATION 21-345

## (undated) DEVICE — DRAPE MAYO STAND 23X54 8337

## (undated) DEVICE — LINEN TOWEL PACK X6 WHITE 5487

## (undated) DEVICE — SUCTION IRR STRYKERFLOW II W/TIP 250-070-520

## (undated) DEVICE — DRSG PRIMAPORE 02X3" 7133

## (undated) DEVICE — SOL NACL 0.9% INJ 1000ML BAG 2B1324X

## (undated) DEVICE — KIT PATIENT POSITIONING PIGAZZI LATEX FREE 40580

## (undated) DEVICE — DAVINCI XI DRAPE COLUMN 470341

## (undated) DEVICE — DAVINCI XI ESU BIPOLAR 3MM ENDOWRIST FENESTRATED EXT 471205

## (undated) DEVICE — SOL ADH LIQUID BENZOIN SWAB 0.6ML C1544

## (undated) DEVICE — SOL WATER IRRIG 1000ML BOTTLE 2F7114

## (undated) DEVICE — GLOVE PROTEXIS BLUE W/NEU-THERA 7.0  2D73EB70

## (undated) DEVICE — DAVINCI XI DRAPE ARM 470015

## (undated) DEVICE — DAVINCI XI OBTURATOR BLADELESS 8MM 470359

## (undated) DEVICE — NDL INSUFFLATION 13GA 120MM C2201

## (undated) DEVICE — SU VICRYL 4-0 PS-2 18" UND J496H

## (undated) DEVICE — SPECIMEN TRAP MUCOUS 40ML LUKI C30200A

## (undated) DEVICE — DAVINCI XI MONOPOLAR SCISSORS HOT SHEARS 8MM 470179

## (undated) DEVICE — ENDO OBTURATOR ACCESS PORT BLADELESS 8X100MM IAS8-100LP

## (undated) DEVICE — KOH COLPOTOMIZER OCCLUDER  CPO-6

## (undated) DEVICE — SU WND CLOSURE VLOC 180 ABS 0 9" GS-21 VLOCL0346

## (undated) DEVICE — JELLY LUBRICATING SURGILUBE 2OZ TUBE

## (undated) DEVICE — DAVINCI HOT SHEARS TIP COVER  400180

## (undated) DEVICE — GLOVE PROTEXIS W/NEU-THERA 6.5  2D73TE65

## (undated) DEVICE — SUCTION MANIFOLD NEPTUNE 2 SYS 4 PORT 0702-020-000

## (undated) RX ORDER — PROPOFOL 10 MG/ML
INJECTION, EMULSION INTRAVENOUS
Status: DISPENSED
Start: 2021-05-19

## (undated) RX ORDER — OXYCODONE HYDROCHLORIDE 5 MG/1
TABLET ORAL
Status: DISPENSED
Start: 2021-05-19

## (undated) RX ORDER — ACETAMINOPHEN 325 MG/1
TABLET ORAL
Status: DISPENSED
Start: 2021-05-19

## (undated) RX ORDER — HYDROMORPHONE HYDROCHLORIDE 1 MG/ML
INJECTION, SOLUTION INTRAMUSCULAR; INTRAVENOUS; SUBCUTANEOUS
Status: DISPENSED
Start: 2021-05-19

## (undated) RX ORDER — FENTANYL CITRATE 50 UG/ML
INJECTION, SOLUTION INTRAMUSCULAR; INTRAVENOUS
Status: DISPENSED
Start: 2021-05-19

## (undated) RX ORDER — PHENAZOPYRIDINE HYDROCHLORIDE 200 MG/1
TABLET, FILM COATED ORAL
Status: DISPENSED
Start: 2021-05-19

## (undated) RX ORDER — LIDOCAINE HYDROCHLORIDE 20 MG/ML
INJECTION, SOLUTION EPIDURAL; INFILTRATION; INTRACAUDAL; PERINEURAL
Status: DISPENSED
Start: 2021-05-19

## (undated) RX ORDER — ONDANSETRON 2 MG/ML
INJECTION INTRAMUSCULAR; INTRAVENOUS
Status: DISPENSED
Start: 2021-05-19

## (undated) RX ORDER — HEPARIN SODIUM 5000 [USP'U]/.5ML
INJECTION, SOLUTION INTRAVENOUS; SUBCUTANEOUS
Status: DISPENSED
Start: 2021-05-19

## (undated) RX ORDER — CEFAZOLIN SODIUM 2 G/100ML
INJECTION, SOLUTION INTRAVENOUS
Status: DISPENSED
Start: 2021-05-19